# Patient Record
Sex: FEMALE | Race: WHITE | Employment: UNEMPLOYED | URBAN - METROPOLITAN AREA
[De-identification: names, ages, dates, MRNs, and addresses within clinical notes are randomized per-mention and may not be internally consistent; named-entity substitution may affect disease eponyms.]

---

## 2018-02-13 ENCOUNTER — HOSPITAL ENCOUNTER (OUTPATIENT)
Dept: OTHER | Age: 28
Discharge: HOME | End: 2018-02-28
Attending: OBSTETRICS & GYNECOLOGY | Admitting: OBSTETRICS & GYNECOLOGY

## 2018-03-01 ENCOUNTER — HOSPITAL ENCOUNTER (OUTPATIENT)
Dept: OTHER | Age: 28
Discharge: HOME | End: 2018-03-31
Attending: OBSTETRICS & GYNECOLOGY | Admitting: OBSTETRICS & GYNECOLOGY

## 2018-07-12 ENCOUNTER — HOSPITAL ENCOUNTER (OUTPATIENT)
Dept: OTHER | Age: 28
Discharge: HOME | End: 2018-07-13
Attending: OBSTETRICS & GYNECOLOGY | Admitting: OBSTETRICS & GYNECOLOGY

## 2019-05-01 ENCOUNTER — HOSPITAL ENCOUNTER (EMERGENCY)
Age: 29
Discharge: HOME | End: 2019-05-01
Attending: EMERGENCY MEDICINE
Payer: MEDICAID

## 2019-05-01 ENCOUNTER — APPOINTMENT (OUTPATIENT)
Dept: CT IMAGING | Age: 29
End: 2019-05-01
Payer: MEDICAID

## 2019-05-01 VITALS
WEIGHT: 150 LBS | OXYGEN SATURATION: 100 % | SYSTOLIC BLOOD PRESSURE: 119 MMHG | HEART RATE: 66 BPM | RESPIRATION RATE: 18 BRPM | TEMPERATURE: 98.7 F | DIASTOLIC BLOOD PRESSURE: 82 MMHG | BODY MASS INDEX: 23.54 KG/M2 | HEIGHT: 67 IN

## 2019-05-01 DIAGNOSIS — H54.7 DECREASED VISION: Primary | ICD-10-CM

## 2019-05-01 LAB
ANION GAP SERPL CALCULATED.3IONS-SCNC: 11 MMOL/L (ref 3–16)
BUN BLDV-MCNC: 14 MG/DL (ref 7–20)
CALCIUM SERPL-MCNC: 9.6 MG/DL (ref 8.3–10.6)
CHLORIDE BLD-SCNC: 103 MMOL/L (ref 99–110)
CO2: 26 MMOL/L (ref 21–32)
CREAT SERPL-MCNC: 0.6 MG/DL (ref 0.6–1.1)
GFR AFRICAN AMERICAN: >60
GFR NON-AFRICAN AMERICAN: >60
GLUCOSE BLD-MCNC: 102 MG/DL (ref 70–99)
POTASSIUM REFLEX MAGNESIUM: 3.8 MMOL/L (ref 3.5–5.1)
SODIUM BLD-SCNC: 140 MMOL/L (ref 136–145)

## 2019-05-01 PROCEDURE — 96374 THER/PROPH/DIAG INJ IV PUSH: CPT

## 2019-05-01 PROCEDURE — 80048 BASIC METABOLIC PNL TOTAL CA: CPT

## 2019-05-01 PROCEDURE — 70498 CT ANGIOGRAPHY NECK: CPT

## 2019-05-01 PROCEDURE — 6360000004 HC RX CONTRAST MEDICATION: Performed by: NURSE PRACTITIONER

## 2019-05-01 PROCEDURE — 6360000002 HC RX W HCPCS: Performed by: NURSE PRACTITIONER

## 2019-05-01 PROCEDURE — 70496 CT ANGIOGRAPHY HEAD: CPT

## 2019-05-01 PROCEDURE — 99284 EMERGENCY DEPT VISIT MOD MDM: CPT

## 2019-05-01 PROCEDURE — 96375 TX/PRO/DX INJ NEW DRUG ADDON: CPT

## 2019-05-01 RX ORDER — DIPHENHYDRAMINE HYDROCHLORIDE 50 MG/ML
25 INJECTION INTRAMUSCULAR; INTRAVENOUS ONCE
Status: COMPLETED | OUTPATIENT
Start: 2019-05-01 | End: 2019-05-01

## 2019-05-01 RX ORDER — METOCLOPRAMIDE HYDROCHLORIDE 5 MG/ML
10 INJECTION INTRAMUSCULAR; INTRAVENOUS ONCE
Status: COMPLETED | OUTPATIENT
Start: 2019-05-01 | End: 2019-05-01

## 2019-05-01 RX ORDER — VENLAFAXINE 75 MG/1
75 TABLET ORAL DAILY
COMMUNITY
End: 2020-12-22

## 2019-05-01 RX ADMIN — METOCLOPRAMIDE 10 MG: 5 INJECTION, SOLUTION INTRAMUSCULAR; INTRAVENOUS at 13:04

## 2019-05-01 RX ADMIN — DIPHENHYDRAMINE HYDROCHLORIDE 25 MG: 50 INJECTION, SOLUTION INTRAMUSCULAR; INTRAVENOUS at 13:04

## 2019-05-01 RX ADMIN — IOPAMIDOL 75 ML: 755 INJECTION, SOLUTION INTRAVENOUS at 15:08

## 2019-05-01 ASSESSMENT — PAIN DESCRIPTION - LOCATION: LOCATION: EYE

## 2019-05-01 ASSESSMENT — ENCOUNTER SYMPTOMS
EYE PAIN: 1
SORE THROAT: 0
EYE ITCHING: 0
PHOTOPHOBIA: 1
EYE DISCHARGE: 0
EYE REDNESS: 0

## 2019-05-01 ASSESSMENT — PAIN SCALES - GENERAL: PAINLEVEL_OUTOF10: 7

## 2019-05-01 ASSESSMENT — VISUAL ACUITY
OU: 20/20
OD: 20/20

## 2019-05-01 ASSESSMENT — PAIN DESCRIPTION - FREQUENCY: FREQUENCY: CONTINUOUS

## 2019-05-01 ASSESSMENT — PAIN DESCRIPTION - DESCRIPTORS: DESCRIPTORS: ACHING

## 2019-05-01 ASSESSMENT — PAIN DESCRIPTION - PAIN TYPE: TYPE: ACUTE PAIN

## 2019-05-01 ASSESSMENT — PAIN DESCRIPTION - ORIENTATION: ORIENTATION: LEFT

## 2019-05-01 NOTE — ED NOTES
Patient resting on stretcher, no needs at this time. Nad. VSS. Pt awaiting further dispo. Pt ambulated to bathroom with steady gait and no assist. Bed in low position, call bell in reach. police at bedside. Will continue to monitor.       Peterson Chirinos RN  05/01/19 3111

## 2019-05-01 NOTE — ED NOTES
IV attempt x 2 unsuccessful. Nivia Banuelos, EMERALD, to attempt.       Rosalind Cogan, RN  05/01/19 9112

## 2019-05-01 NOTE — ED PROVIDER NOTES
[address] ED  eMERGENCY dEPARTMENT eNCOUnter        Pt Name: Lorie De La Paz  MRN: [id number]  [place] 1990  Date of evaluation: 5/1/2019  Provider: ARIEL Singh CNP  PCP: Benson Watson DO    This patient was seen and evaluated by the attending physician Alize Lanier       Chief Complaint   Patient presents with    Eye Pain     pt states that she sat up in her bunk on sunday, felt a \"pop\" in her head and then started having L eye pain. pt states that her \"vision is black in my L eye since sunday. \"        HISTORY OF PRESENT ILLNESS   (Location/Symptom, Timing/Onset, Context/Setting, Quality, Duration, Modifying Factors, Severity)  Note limiting factors. Lorie De La Paz is a 33 y.o. female that presents to the ED today with complaints of left eye pain. 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 Patient states pain started on Sunday morning when she sat up in her bunk. She denies hitting her head. She has a headache that she describes as sharp and stabbing in the front of her head. She states she had immediate vision loss in her left eye. She states she saw the nurse at the care home at the time of the incident. She states everything is black and she is not able to see anything in her left eye. She complains of no recent history of head trauma. She complains of photophobia she denies fever and chills. Denies recent history of cough congestion runny nose or sinus infection. Denies redness or draining from her eyes. Nursing Notes were all reviewed and agreed with or any disagreements were addressed  in the HPI. REVIEW OF SYSTEMS    (2-9 systems for level 4, 10 or more for level 5)     Review of Systems   Constitutional: Negative for chills and fever. HENT: Positive for ear pain. Negative for congestion and sore throat. Eyes: Positive for photophobia, pain and visual disturbance. Negative for discharge, redness and itching. Skin: Negative for rash. Neurological: Positive for headaches. Negative for dizziness, seizures, syncope, speech difficulty and numbness. Feel like going to pass out due to pain. States vomiting 10 + times secondary to the pain. Positives and Pertinent negatives as per HPI. Except as noted abovein the ROS, all other systems were reviewed and negative. PAST MEDICAL HISTORY     Past Medical History:   Diagnosis Date    Anemia     Anxiety     Bipolar disorder ([address]     Declines CF and Quad screen 12/19/2011    Drug use 12/19/2011    Hx of Marijuana use Positive amphetamine drug screen-08/03/2012     Hepatitis C     Hx of amniocentesis July, 2012    Kidney stone     Migraines     worsened with pregnancy, takes Reglan for    [facility] (premature atrial contraction)     baby has PAC's, will need EKG after delivery    Smokes 1-2 cigs/day 12/19/2011    Pt quit smoking, blanket was given. SURGICAL HISTORY     Past Surgical History:   Procedure Laterality Date    CHOLECYSTECTOMY  9/25/2014    laparoscopic    KNEE ARTHROSCOPY  2008, 2009    2  rt. knee surg, torn meniscus    KNEE ARTHROSCOPY Right 1/2/13    Video arthroscopy with partial medial and lateral meniscectomy, chondroplasty, removal of loose bodies and medial plica excision    WISDOM TOOTH EXTRACTION           CURRENTMEDICATIONS       Discharge Medication List as of 5/1/2019  4:46 PM      CONTINUE these medications which have NOT CHANGED    Details   venlafaxine (EFFEXOR) 75 MG tablet Take 75 mg by mouth dailyHistorical Med      Buprenorphine HCl-Naloxone HCl (SUBOXONE SL) Place 16 mg under the tongue dailyHistorical Med               ALLERGIES     Mobic [meloxicam];  Ondansetron; and Voltaren [diclofenac sodium]    FAMILYHISTORY       Family History   Problem Relation Age of Onset    Depression Mother     Thyroid Disease Maternal Grandmother     Arthritis Maternal Grandmother     Hypertension Maternal Grandfather SOCIAL HISTORY       Social History     Socioeconomic History    Marital status: Single     Spouse name: None    Number of children: 1    Years of education: None    Highest education level: None   Occupational History     Comment: [employer]   Social Needs    Financial resource strain: None    Food insecurity:     Worry: None     Inability: None    Transportation needs:     Medical: None     Non-medical: None   Tobacco Use    Smoking status: Former Smoker     Packs/day: 0.50     Years: 5.00     Pack years: 2.50     Types: Cigarettes     Last attempt to quit: 2018     Years since quittin.4    Smokeless tobacco: Former User   Substance and Sexual Activity    Alcohol use: No    Drug use: No     Comment: before pregnancy and 'a little in the beginning'    Sexual activity: Yes     Partners: Male   Lifestyle    Physical activity:     Days per week: None     Minutes per session: None    Stress: None   Relationships    Social connections:     Talks on phone: None     Gets together: None     Attends Caodaism service: None     Active member of club or organization: None     Attends meetings of clubs or organizations: None     Relationship status: None    Intimate partner violence:     Fear of current or ex partner: None     Emotionally abused: None     Physically abused: None     Forced sexual activity: None   Other Topics Concern    None   Social History Narrative    None       SCREENINGS    [place] Coma Scale  Eye Opening: Spontaneous  Best Verbal Response: Oriented  Best Motor Response: Obeys commands  [place] Coma Scale Score: 15        PHYSICAL EXAM    (up to 7 for level 4, 8 or more for level 5)     ED Triage Vitals [19 1058]   BP Temp Temp Source Pulse Resp SpO2 Height Weight   (!) 117/92 98.7 °F (37.1 °C) Oral 76 18 100 % 5' 7\" (1.702 m) 150 lb (68 kg)       Physical Exam   Constitutional: She is oriented to person, place, and time.  She appears well-developed and well-nourished. No distress. HENT:   Head: Normocephalic and atraumatic. Right Ear: External ear normal.   Left Ear: External ear normal.   Eyes: Pupils are equal, round, and reactive to light. Conjunctivae and EOM are normal.   EM and intact patient does complain of slight pain with movement there is no redness or drainage. Pupils are equal round and reactive to light. She does complain of photophobia. Pupils 4/3 reactive bilaterally. No cloudiness present. Neck: Normal range of motion. No JVD present. No tracheal deviation present. Cardiovascular: Normal rate, regular rhythm, normal heart sounds and intact distal pulses. Exam reveals no gallop and no friction rub. No murmur heard. Pulmonary/Chest: Effort normal and breath sounds normal. No stridor. No respiratory distress. She has no wheezes. She has no rales. She exhibits no tenderness. Abdominal:   Does not complain of abdominal pain or tenderness   Musculoskeletal: Normal range of motion. Lymphadenopathy:     She has no cervical adenopathy. Neurological: She is alert and oriented to person, place, and time. No cranial nerve deficit or sensory deficit. Coordination normal.   Skin: Skin is warm and dry. Capillary refill takes less than 2 seconds. No rash noted. She is not diaphoretic. No erythema. No pallor. Psychiatric: She has a normal mood and affect. Her behavior is normal. Judgment normal.       DIAGNOSTIC RESULTS   LABS:    Labs Reviewed   BASIC METABOLIC PANEL W/ REFLEX TO MG FOR LOW K - Abnormal; Notable for the following components:       Result Value    Glucose 102 (*)     All other components within normal limits    Narrative:     Performed at:  [facility]) - [address]   Phone [phone]       All other labs were within normal range or not returned as of this dictation. EKG:  All EKG's are interpreted by the Emergency Department Physician who either signs orCo-signs this chart in the absence of a cardiologist.  Please see their note for interpretation of EKG. RADIOLOGY:   Non-plain film images such as CT, Ultrasound and MRI are read by the radiologist. Plain radiographic images are visualized andpreliminarily interpreted by the  ED Provider with the below findings:        Interpretation perthe Radiologist below, if available at the time of this note:    CTA HEAD W CONTRAST   Final Result   Unremarkable CTA of the head and neck. No evidence of intracranial aneurysm. A noncontrast examination of the head was not performed, limiting evaluation   for intracranial hemorrhage. CTA NECK W CONTRAST   Final Result   Unremarkable CTA of the head and neck. No evidence of intracranial aneurysm. A noncontrast examination of the head was not performed, limiting evaluation   for intracranial hemorrhage. No results found. PROCEDURES   Unless otherwise noted below, none     Procedures    CRITICAL CARE TIME   N/A    CONSULTS:  IP CONSULT TO OPHTHALMOLOGY      EMERGENCY DEPARTMENT COURSE and DIFFERENTIALDIAGNOSIS/MDM:   Vitals:    Vitals:    05/01/19 1058 05/01/19 1417 05/01/19 1657   BP: (!) 117/92 134/83 119/82   Pulse: 76 74 66   Resp: 18 16 18   Temp: 98.7 °F (37.1 °C)     TempSrc: Oral     SpO2: 100% 100% 100%   Weight: 150 lb (68 kg)     Height: 5' 7\" (1.702 m)         Patient was given thefollowing medications:  Medications   diphenhydrAMINE (BENADRYL) injection 25 mg (25 mg Intravenous Given 5/1/19 1304)   metoclopramide (REGLAN) injection 10 mg (10 mg Intravenous Given 5/1/19 1304)   iopamidol (ISOVUE-370) 76 % injection 75 mL (75 mLs Intravenous Given 5/1/19 1508)       MDM  Tang Orozco is a 60-year-old female patient presents to the ED today with complaints of headache and decreased patient in her left eye. The patient was seen by myself and Dr. Hammad Sorensen.  On presentation patient's awake alert and oriented she is calm and cooperative she is complaining of a frontal headache that she describes as sharp and stabbing and complaining of decreased vision in her left eye she states that she cannot see anything out of her left eye. My workup in the ED included visual acutity exam, BMP and CTA head and neck. On PE there is no outward signs of trauma and the patient does not complain of any head injury, scalp was intact as no rashes lesions swelling or bruising. Pupils are equal round and reactive to light, EOMs intact however she does complain of slight pain with movement of her left eye. There is no drainage or redness to the conjunctival. No cloudiness. Funduscopic exam without abnormality. + Photophobia. I was not able to obtain occular pressures due to the tonopen not functioning properly. Patient was treated with reglan and benadryl for atypical headache which she stated did not give her much relief. Sounds the CT head and neck were unremarkable and showed no evidence of intracranial aneurysm or intracranial hemorrhage. I discussed the results of the CT scan with the patient. I discussed the patient's condition with ophthalmology Dr. Debbie Tavarez who recommends follow-up on Thursday or Friday in their office. This time I feel the patient safe for discharge home. Discharge instructions given to the patient with instructions to follow up with ophthalmology on Thursday or Friday. Instructions were given for symptoms that would warrant return back to the ED. FINAL IMPRESSION      1.  Decreased vision          DISPOSITION/PLAN   DISPOSITION Decision To Discharge 05/01/2019 04:41:02 PM      PATIENT REFERREDTO:  Supriya Webb[address] [facility]. [id number] [phone]    Call in 1 day  Will need to call and schedule an appointment on Thursday or Friday    Michela Tavarez, [address] [facility]            DISCHARGE MEDICATIONS:  Discharge Medication List as of 5/1/2019  4:46 PM DISCONTINUED MEDICATIONS:  Discharge Medication List as of 5/1/2019  4:46 PM                 (Please note that portions ofthis note were completed with a voice recognition program.  Efforts were made to edit the dictations but occasionally words are mis-transcribed.)    ARIEL Singh CNP (electronically signed)          ARIEL Singh CNP  05/01/19 180

## 2019-05-01 NOTE — ED NOTES
Pt to CT at this time. Pt stable upon transport. Will continue to monitor.       Jesse Samson RN  05/01/19 5435

## 2019-05-02 ENCOUNTER — HOSPITAL ENCOUNTER (EMERGENCY)
Age: 29
Discharge: HOME | End: 2019-05-02
Attending: EMERGENCY MEDICINE
Payer: MEDICAID

## 2019-05-02 VITALS
HEART RATE: 78 BPM | HEIGHT: 67 IN | WEIGHT: 150 LBS | SYSTOLIC BLOOD PRESSURE: 98 MMHG | BODY MASS INDEX: 23.54 KG/M2 | OXYGEN SATURATION: 100 % | DIASTOLIC BLOOD PRESSURE: 61 MMHG | TEMPERATURE: 98.3 F

## 2019-05-02 DIAGNOSIS — F44.9 CONVERSION DISORDER: ICD-10-CM

## 2019-05-02 DIAGNOSIS — H54.7 VISION LOSS: ICD-10-CM

## 2019-05-02 DIAGNOSIS — R51.9 ACUTE NONINTRACTABLE HEADACHE, UNSPECIFIED HEADACHE TYPE: Primary | ICD-10-CM

## 2019-05-02 DIAGNOSIS — R11.0 NAUSEA: ICD-10-CM

## 2019-05-02 PROCEDURE — 6370000000 HC RX 637 (ALT 250 FOR IP): Performed by: NURSE PRACTITIONER

## 2019-05-02 PROCEDURE — 99284 EMERGENCY DEPT VISIT MOD MDM: CPT

## 2019-05-02 RX ORDER — METOCLOPRAMIDE 10 MG/1
10 TABLET ORAL ONCE
Status: COMPLETED | OUTPATIENT
Start: 2019-05-02 | End: 2019-05-02

## 2019-05-02 RX ORDER — METOCLOPRAMIDE 10 MG/1
10 TABLET ORAL 4 TIMES DAILY PRN
Qty: 10 TABLET | Refills: 0 | Status: SHIPPED | OUTPATIENT
Start: 2019-05-02 | End: 2020-12-22

## 2019-05-02 RX ORDER — DIPHENHYDRAMINE HCL 25 MG
25 TABLET ORAL ONCE
Status: COMPLETED | OUTPATIENT
Start: 2019-05-02 | End: 2019-05-02

## 2019-05-02 RX ORDER — ACETAMINOPHEN 325 MG/1
650 TABLET ORAL EVERY 6 HOURS PRN
Qty: 40 TABLET | Refills: 0 | Status: SHIPPED | OUTPATIENT
Start: 2019-05-02 | End: 2020-12-22

## 2019-05-02 RX ADMIN — DIPHENHYDRAMINE HCL 25 MG: 25 TABLET ORAL at 15:51

## 2019-05-02 RX ADMIN — METOCLOPRAMIDE 10 MG: 10 TABLET ORAL at 15:51

## 2019-05-02 ASSESSMENT — VISUAL ACUITY
OD: 20/30
OU: 20/30

## 2019-05-02 ASSESSMENT — PAIN DESCRIPTION - PAIN TYPE: TYPE: ACUTE PAIN

## 2019-05-02 ASSESSMENT — PAIN DESCRIPTION - LOCATION: LOCATION: HEAD

## 2019-05-02 NOTE — ED PROVIDER NOTES
ED Course as of May 02 1211   Wed May 01, 2019   1154 Point of care limited Ocular exam  Procedure note:  Indication: eye pain, eye/orbital trauma, vision change, visual loss, head injury, suspected foreign body, headache, educational, credentialing    Billable study: yes    Views:  Left eye retinal Contour: normal  Left eye vitreous body: anechoic yes, hyperechoic density no    Impression:   Sonographic evidence of retinal detachment: Absent   Vitreous hemorrhage: Absent      Images obtained by estela, interpreted by estela. Images were saved to ultrasound machine.         [WL]   1227 I have personally performed and/or participated in the history, exam and medical decision making and agree with all pertinent clinical information. I have also reviewed and agree with the past medical, family and social history unless otherwise noted. Patient presents for evaluation of left eye complete vision loss since Sunday onset when she had left side headache that has been persistent. She denies eye pain. There is no redness. She did not have any trauma. She has worse headache with extraocular motion. Patient nontoxic in no acute distress. Extraocular motion is full and intact with no periorbital erythema or significant edema. Pupils are equal round and reactive to light bilaterally with no increasing pain. She has no tenderness over the temporal area    Patient with painless vision loss that she does have headache, and migraine is considered in the differential.  Will discuss case with ophthalmology especially given access to care issues with being imprisoned. Ultrasound negative for acute lens/vitreous/retinal apparent issues. Unable to obtain good view of optic nerve for measurement.   Obtaining vascular studies    [WL]      ED Course User Index  [WL] DO Ale Best DO  05/02/19 1212

## 2019-05-02 NOTE — ED NOTES
Presenting Problem: Pt can not see out of her left eye and see black spots out of her right eye. Appearance/Hygiene:  well-appearing, good grooming and good hygiene   Motor Behavior: WNL   Attitude: cooperative  Affect: normal affect   Speech: normal pitch and normal volume  Mood: anxious and within normal limits   Thought Processes: [place]  Perceptions: Absent   Thought content: Clear  Suicidal ideation:  None  Homicidal ideation:  none  Orientation: A&Ox4   Memory: intact  Concentration: Good    Insight/ judgement: normal insight and judgment      Psychosocial and contextual factors: Pt claims she can not see out of her left eye and sees black spots out of her right eye. C-SSRS Summary (including current and past suicidal ideation, plan, intent, and attempts) : None    Psychiatric History: None    Patient reported diagnosis: anxiety, PTSD, depression.      Outpatient services/ Provider: None    Previous Inpatient Admissions( including location and dates if known): None    Self-injurious/ Self-harm behavior: None    History of violence: None    Current Substance use: None    Trauma identified: None    Access to Firearms: None    ASSESSMENT FOR IMMINENT FUTURE DANGER:      RISK FACTORS:    [x]  Age <25 or >49   []  Male gender   []  Depressed mood   []  Active suicidal ideation   []  Suicide plan   []  Suicide attempt   []  Access to lethal means   []  Prior suicide attempt   []  Active substance abuse   []  Highly impulsive behaviors   []  Not attending to self-care/ADLs    []  Recent significant loss   []  Chronic pain or medical illness   []  Social isolation   []  History of violence   []  Active psychosis   []  Cognitive impairment    [x]  No outpatient services in place   []  Medication noncompliance   []  No collateral information to support safety      PROTECTIVE FACTORS:  [] Age >25 and <55  [x] Female gender   [] Denies depression  [] Denies suicidal ideation  [] Does not have lethal plan   [] Does not have access to guns or weapons  [] Patient is verbally mary for safety  [x] No prior suicide attempts  [x] No active substance abuse  [x] Patient has social or family support  [x] No active psychosis or cognitive dysfunction  [x] Physically healthy  [] Has outpatient services in place  [x] Compliant with recommended medications  [] Patient is future oriented with plans to get out of prison. Clinical Summary:    Patient presents to the ED voluntarily after losing her vision in her left eye. Patient was clinically sober at the time of the evaluation. Patient was evaluated and offered supportive counseling. Pt states Sunday she started seeing black spot in her left eye, then completely lost vision in the left eye. Pt was brought to the hospital and checked out yesterday. No problems were noted. Today the pt states she continues to not have vision to her left eye and now she sees black spots through her right eye. Pt also states the left side of her body is numb and she can not feel anything. Pt states she takes effexor 75 mg daily and has for over a year.   Pt states she is not suicidal or homicidal.       Kassie Abrams RN  05/02/19 7574

## 2019-05-02 NOTE — ED NOTES
Level of Care Disposition:  Discharge    Patient was seen by ED provider and [facility] AN AFFILIATE [facility] staff. The case was presented to psychiatric provider on-call Dr. Merly José. Based on the ED evaluation and information presented to the provider by Mona Rosario, the decision was made to discharge patient with the following referrals: outpatient treatment      RATIONALE FOR NON-ADMISSION:  The patient does not meet criteria for an involuntary psychiatric admission because pt does not pose an imminent threat to herself or anyone else.     Insurance Pre certification Authorization: JENNY Vasques RN  05/02/19 7739

## 2019-05-02 NOTE — ED PROVIDER NOTES
[address] ED  eMERGENCY dEPARTMENT eNCOUnter        Pt Name: John Andino  MRN: [id number]  [place] 1990  Date of evaluation: 5/2/2019  Provider: ARIEL Bradford CNP  PCP: Anthony Martin DO  ED Attending: No att. providers found    [address] [place] [address]       Chief Complaint   Patient presents with    Headache     headache started sunday    Eye Problem     was here yesterday got left eye checked out, lost vision to left eye on sunday and right eye today seeing black spots. HISTORY OF PRESENT ILLNESS   (Location/Symptom, Timing/Onset, Context/Setting, Quality, Duration, Modifying Factors, Severity)  Note limiting factors. John Andino is a 33 y.o. female  who presents to the emergency department with complaints of headache and vision loss. Patient was seen and fully evaluated in this emergency department yesterday for left vision loss and headache. She reports that her symptoms started on Sunday after sitting up in bed and feeling a pop in her head. Since that time she has had no vision in her left eye. She reports that since yesterday her headache has worsened and she is now seeing black spots in her right eye. Headache is about a 7 out of 10. She is currently in the [facility] and is accompanied by a . She was supposed to follow up with ophthalmology today or tomorrow however it is unknown if she has an appointment. Nursing Notes were all reviewed and agreed with or any disagreements were addressed  in the HPI. REVIEW OF SYSTEMS    (2-9 systems for level 4, 10 or more for level 5)     Review of Systems   Constitutional: Negative for chills and fever. HENT: Negative. Eyes: Positive for pain and visual disturbance. Negative for photophobia, discharge, redness and itching. Respiratory: Negative for cough and shortness of breath. Gastrointestinal: Positive for nausea and vomiting. Negative for abdominal pain.    Genitourinary: Negative for dysuria. Musculoskeletal: Negative. Skin: Negative. Neurological: Positive for headaches. Negative for dizziness, weakness, light-headedness and numbness. Psychiatric/Behavioral: Negative. Positives and Pertinent negatives as per HPI. Except as noted abovein the ROS, all other systems were reviewed and negative. PAST MEDICAL HISTORY     Past Medical History:   Diagnosis Date    Anemia     Anxiety     Bipolar disorder ([address]     Declines CF and Quad screen 12/19/2011    Drug use 12/19/2011    Hx of Marijuana use Positive amphetamine drug screen-08/03/2012     Hepatitis C     Hx of amniocentesis July, 2012    Kidney stone     Migraines     worsened with pregnancy, takes Reglan for    [facility] (premature atrial contraction)     baby has PAC's, will need EKG after delivery    Smokes 1-2 cigs/day 12/19/2011    Pt quit smoking, blanket was given. SURGICAL HISTORY     Past Surgical History:   Procedure Laterality Date    CHOLECYSTECTOMY  9/25/2014    laparoscopic    KNEE ARTHROSCOPY  2008, 2009    2  rt. knee surg, torn meniscus    KNEE ARTHROSCOPY Right 1/2/13    Video arthroscopy with partial medial and lateral meniscectomy, chondroplasty, removal of loose bodies and medial plica excision    WISDOM TOOTH EXTRACTION           CURRENTMEDICATIONS       Discharge Medication List as of 5/2/2019  5:14 PM      CONTINUE these medications which have NOT CHANGED    Details   venlafaxine (EFFEXOR) 75 MG tablet Take 75 mg by mouth dailyHistorical Med      Buprenorphine HCl-Naloxone HCl (SUBOXONE SL) Place 16 mg under the tongue dailyHistorical Med               ALLERGIES     Mobic [meloxicam];  Ondansetron; and Voltaren [diclofenac sodium]    FAMILYHISTORY       Family History   Problem Relation Age of Onset    Depression Mother     Thyroid Disease Maternal Grandmother     Arthritis Maternal Grandmother     Hypertension Maternal Grandfather           SOCIAL HISTORY Social History     Socioeconomic History    Marital status: Single     Spouse name: None    Number of children: 1    Years of education: None    Highest education level: None   Occupational History     Comment: [employer]   Social Needs    Financial resource strain: None    Food insecurity:     Worry: None     Inability: None    Transportation needs:     Medical: None     Non-medical: None   Tobacco Use    Smoking status: Former Smoker     Packs/day: 0.50     Years: 5.00     Pack years: 2.50     Types: Cigarettes     Last attempt to quit: 2018     Years since quittin.4    Smokeless tobacco: Former User   Substance and Sexual Activity    Alcohol use: No    Drug use: No     Comment: before pregnancy and 'a little in the beginning'    Sexual activity: Yes     Partners: Male   Lifestyle    Physical activity:     Days per week: None     Minutes per session: None    Stress: None   Relationships    Social connections:     Talks on phone: None     Gets together: None     Attends Temple service: None     Active member of club or organization: None     Attends meetings of clubs or organizations: None     Relationship status: None    Intimate partner violence:     Fear of current or ex partner: None     Emotionally abused: None     Physically abused: None     Forced sexual activity: None   Other Topics Concern    None   Social History Narrative    None       SCREENINGS             PHYSICAL EXAM    (up to 7 for level 4, 8 or more for level 5)     ED Triage Vitals [19 1142]   BP Temp Temp Source Pulse Resp SpO2 Height Weight   111/81 98.3 °F (36.8 °C) Oral 78 -- 100 % 5' 7\" (1.702 m) 150 lb (68 kg)       Physical Exam   Constitutional: She appears well-developed and well-nourished. HENT:   Head: Normocephalic and atraumatic. Right Ear: External ear normal.   Left Ear: External ear normal.   Nose: Nose normal.   Eyes: Pupils are equal, round, and reactive to light.  Conjunctivae, EOM and lids are normal. Right eye exhibits no chemosis, no discharge, no exudate and no hordeolum. No foreign body present in the right eye. Left eye exhibits no chemosis, no discharge, no exudate and no hordeolum. No foreign body present in the left eye. Right conjunctiva is not injected. Right conjunctiva has no hemorrhage. Left conjunctiva is not injected. Left conjunctiva has no hemorrhage. Right eye exhibits normal extraocular motion and no nystagmus. Left eye exhibits normal extraocular motion and no nystagmus. Fundoscopic exam:       The right eye shows no exudate, no hemorrhage and no papilledema. The left eye shows no exudate, no hemorrhage and no papilledema. Pupils 5mm bilaterally. Left visual field deficit. Neck: Normal range of motion. Cardiovascular: Normal rate and regular rhythm. Pulmonary/Chest: Effort normal and breath sounds normal.   Abdominal: Soft. Bowel sounds are normal. She exhibits no distension. There is no tenderness. Musculoskeletal: Normal range of motion. Neurological: She is alert. No sensory deficit. She exhibits normal muscle tone. Skin: Skin is warm and dry. Psychiatric: She has a normal mood and affect. Her behavior is normal.   Nursing note and vitals reviewed. DIAGNOSTIC RESULTS   LABS:    Labs Reviewed - No data to display    All other labs were within normal range or not returned as of this dictation. EKG: All EKG's are interpreted by the Emergency Department Physician who either signs orCo-signs this chart in the absence of a cardiologist.  Please see their note for interpretation of EKG. RADIOLOGY:   Non-plain film images such as CT, Ultrasound and MRI are read by the radiologist. Plain radiographic images are visualized andpreliminarily interpreted by the  ED Provider with the below findings:    Interpretation per theRadiologist below, if available at the time of this note:    No orders to display     No results found.       PROCEDURES denies questions at time. FINAL IMPRESSION      1. Acute nonintractable headache, unspecified headache type    2. Vision loss    3. Nausea    4. Conversion disorder          DISPOSITION/PLAN   DISPOSITION Decision To Discharge 05/02/2019 12:40:50 PM      PATIENT REFERRED TO:  [address] [facility]  [phone]    Schedule an appointment as soon as possible for a visit in 1 day  For follow up care    [facility]  [address] [address]  [phone]  Go to   As needed, If symptoms worsen      DISCHARGE MEDICATIONS:  Discharge Medication List as of 5/2/2019  5:14 PM      START taking these medications    Details   metoclopramide (REGLAN) 10 MG tablet Take 1 tablet by mouth 4 times daily as needed (Nausea) WARNING:  May cause drowsiness. May impair ability to operate vehicles or machinery.   Do not use in combination with alcohol., Disp-10 tablet, R-0Print      acetaminophen (AMINOFEN) 325 MG tablet Take 2 tablets by mouth every 6 hours as needed for Pain, Disp-40 tablet, R-0Print             DISCONTINUED MEDICATIONS:  Discharge Medication List as of 5/2/2019  5:14 PM                 (Please note that portions ofthis note were completed with a voice recognition program.  Efforts were made to edit the dictations but occasionally words are mis-transcribed.)    ARIEL Angelo CNP (electronically signed)         ARIEL Angelo CNP  05/02/19 1100 [facility], APRN - CNP  05/03/19 7401 [facility], APRN - CNP  05/03/19 8767

## 2019-05-02 NOTE — ED PROVIDER NOTES
I independently examined and evaluated Xiomara Galindo. In brief, 27-year-old female with headache and change in vision    Focused exam revealed patient is awake, alert, oriented ×3 in no acute distress  Eyes are EOMI, PERRLA. Conjunctiva is clear  . Cardiac is regular rate and rhythm. Lungs are clear to auscultation  ED course: We did speak to [facility]. Patient did have a thorough workup for this yesterday. She is to schedule a follow-up appointment in their office. All diagnostic, treatment, and disposition decisions were made by myself in conjunction with the advanced practice provider. For all further details of the patient's emergency department visit, please see the advanced practice provider's documentation. .  Impression change in vision    Comment: Please note this report has been produced using speech recognition software and may contain errors related to that system including errors in grammar, punctuation, and spelling, as well as words and phrases that may be inappropriate. If there are any questions or concerns please feel free to contact the dictating provider for clarification. Magaly Schwartz MD  05/02/19 7218

## 2019-05-02 NOTE — ED PROVIDER NOTES
Care of patient is handed off to me by Dr. Anamika Arroyo pending behavioral team evaluation and final disposition. Behavioral team evaluated the patient and deemed patient safe for discharge to long-term at this time. Patient was given outpatient resources. Patient will follow-up as discussed. Patient discharged in stable condition.          Millie Monson MD  05/02/19 5101

## 2019-05-03 ASSESSMENT — ENCOUNTER SYMPTOMS
VOMITING: 1
PHOTOPHOBIA: 0
ABDOMINAL PAIN: 0
COUGH: 0
NAUSEA: 1
EYE ITCHING: 0
EYE PAIN: 1
EYE DISCHARGE: 0
EYE REDNESS: 0
SHORTNESS OF BREATH: 0

## 2020-12-22 ENCOUNTER — APPOINTMENT (OUTPATIENT)
Dept: GENERAL RADIOLOGY | Age: 30
End: 2020-12-22
Payer: COMMERCIAL

## 2020-12-22 ENCOUNTER — HOSPITAL ENCOUNTER (EMERGENCY)
Age: 30
Discharge: HOME | End: 2020-12-23
Payer: COMMERCIAL

## 2020-12-22 VITALS
BODY MASS INDEX: 26.63 KG/M2 | OXYGEN SATURATION: 100 % | WEIGHT: 170 LBS | HEART RATE: 68 BPM | TEMPERATURE: 97.6 F | RESPIRATION RATE: 16 BRPM | SYSTOLIC BLOOD PRESSURE: 104 MMHG | DIASTOLIC BLOOD PRESSURE: 67 MMHG

## 2020-12-22 PROCEDURE — 96366 THER/PROPH/DIAG IV INF ADDON: CPT

## 2020-12-22 PROCEDURE — 96365 THER/PROPH/DIAG IV INF INIT: CPT

## 2020-12-22 PROCEDURE — 73090 X-RAY EXAM OF FOREARM: CPT

## 2020-12-22 PROCEDURE — 96368 THER/DIAG CONCURRENT INF: CPT

## 2020-12-22 PROCEDURE — 96372 THER/PROPH/DIAG INJ SC/IM: CPT

## 2020-12-22 PROCEDURE — 99283 EMERGENCY DEPT VISIT LOW MDM: CPT

## 2020-12-22 RX ORDER — SERTRALINE HYDROCHLORIDE 100 MG/1
100 TABLET, FILM COATED ORAL DAILY
COMMUNITY
End: 2021-03-19 | Stop reason: ALTCHOICE

## 2020-12-22 RX ORDER — BUPRENORPHINE HYDROCHLORIDE 8 MG/1
20 TABLET SUBLINGUAL DAILY
COMMUNITY
End: 2021-04-16

## 2020-12-22 ASSESSMENT — ENCOUNTER SYMPTOMS
RHINORRHEA: 0
ABDOMINAL PAIN: 0
SHORTNESS OF BREATH: 0
SORE THROAT: 0
COLOR CHANGE: 0

## 2020-12-22 ASSESSMENT — PAIN SCALES - GENERAL: PAINLEVEL_OUTOF10: 7

## 2020-12-23 LAB
A/G RATIO: 0.9 (ref 1.1–2.2)
ALBUMIN SERPL-MCNC: 4 G/DL (ref 3.4–5)
ALP BLD-CCNC: 87 U/L (ref 40–129)
ALT SERPL-CCNC: 16 U/L (ref 10–40)
ANION GAP SERPL CALCULATED.3IONS-SCNC: 9 MMOL/L (ref 3–16)
AST SERPL-CCNC: 24 U/L (ref 15–37)
BASOPHILS ABSOLUTE: 0 K/UL (ref 0–0.2)
BASOPHILS RELATIVE PERCENT: 0.4 %
BILIRUB SERPL-MCNC: 0.5 MG/DL (ref 0–1)
BUN BLDV-MCNC: 9 MG/DL (ref 7–20)
CALCIUM SERPL-MCNC: 9.5 MG/DL (ref 8.3–10.6)
CHLORIDE BLD-SCNC: 97 MMOL/L (ref 99–110)
CO2: 29 MMOL/L (ref 21–32)
CREAT SERPL-MCNC: 0.8 MG/DL (ref 0.6–1.1)
EOSINOPHILS ABSOLUTE: 0 K/UL (ref 0–0.6)
EOSINOPHILS RELATIVE PERCENT: 0.6 %
GFR AFRICAN AMERICAN: >60
GFR NON-AFRICAN AMERICAN: >60
GLOBULIN: 4.5 G/DL
GLUCOSE BLD-MCNC: 105 MG/DL (ref 70–99)
HCG QUALITATIVE: NEGATIVE
HCT VFR BLD CALC: 38 % (ref 36–48)
HEMOGLOBIN: 12.5 G/DL (ref 12–16)
LACTIC ACID, SEPSIS: 1 MMOL/L (ref 0.4–1.9)
LYMPHOCYTES ABSOLUTE: 1.9 K/UL (ref 1–5.1)
LYMPHOCYTES RELATIVE PERCENT: 23.7 %
MCH RBC QN AUTO: 25.1 PG (ref 26–34)
MCHC RBC AUTO-ENTMCNC: 32.8 G/DL (ref 31–36)
MCV RBC AUTO: 76.5 FL (ref 80–100)
MONOCYTES ABSOLUTE: 0.6 K/UL (ref 0–1.3)
MONOCYTES RELATIVE PERCENT: 7.7 %
NEUTROPHILS ABSOLUTE: 5.5 K/UL (ref 1.7–7.7)
NEUTROPHILS RELATIVE PERCENT: 67.6 %
PDW BLD-RTO: 14.8 % (ref 12.4–15.4)
PLATELET # BLD: 264 K/UL (ref 135–450)
PMV BLD AUTO: 7.5 FL (ref 5–10.5)
POTASSIUM REFLEX MAGNESIUM: 3.7 MMOL/L (ref 3.5–5.1)
PROCALCITONIN: 0.03 NG/ML (ref 0–0.15)
RBC # BLD: 4.96 M/UL (ref 4–5.2)
SEDIMENTATION RATE, ERYTHROCYTE: 28 MM/HR (ref 0–20)
SODIUM BLD-SCNC: 135 MMOL/L (ref 136–145)
TOTAL PROTEIN: 8.5 G/DL (ref 6.4–8.2)
WBC # BLD: 8.1 K/UL (ref 4–11)

## 2020-12-23 PROCEDURE — 96366 THER/PROPH/DIAG IV INF ADDON: CPT

## 2020-12-23 PROCEDURE — 85652 RBC SED RATE AUTOMATED: CPT

## 2020-12-23 PROCEDURE — 87040 BLOOD CULTURE FOR BACTERIA: CPT

## 2020-12-23 PROCEDURE — 90471 IMMUNIZATION ADMIN: CPT | Performed by: NURSE PRACTITIONER

## 2020-12-23 PROCEDURE — 2580000003 HC RX 258: Performed by: NURSE PRACTITIONER

## 2020-12-23 PROCEDURE — 90715 TDAP VACCINE 7 YRS/> IM: CPT | Performed by: NURSE PRACTITIONER

## 2020-12-23 PROCEDURE — 85025 COMPLETE CBC W/AUTO DIFF WBC: CPT

## 2020-12-23 PROCEDURE — 80053 COMPREHEN METABOLIC PANEL: CPT

## 2020-12-23 PROCEDURE — 84703 CHORIONIC GONADOTROPIN ASSAY: CPT

## 2020-12-23 PROCEDURE — 96372 THER/PROPH/DIAG INJ SC/IM: CPT

## 2020-12-23 PROCEDURE — 84145 PROCALCITONIN (PCT): CPT

## 2020-12-23 PROCEDURE — 83605 ASSAY OF LACTIC ACID: CPT

## 2020-12-23 PROCEDURE — 6360000002 HC RX W HCPCS: Performed by: NURSE PRACTITIONER

## 2020-12-23 PROCEDURE — 96368 THER/DIAG CONCURRENT INF: CPT

## 2020-12-23 PROCEDURE — 96365 THER/PROPH/DIAG IV INF INIT: CPT

## 2020-12-23 RX ORDER — SULFAMETHOXAZOLE AND TRIMETHOPRIM 800; 160 MG/1; MG/1
1 TABLET ORAL 2 TIMES DAILY
Qty: 20 TABLET | Refills: 0 | Status: SHIPPED | OUTPATIENT
Start: 2020-12-23 | End: 2021-01-02

## 2020-12-23 RX ORDER — CEPHALEXIN 500 MG/1
500 CAPSULE ORAL 4 TIMES DAILY
Qty: 40 CAPSULE | Refills: 0 | Status: SHIPPED | OUTPATIENT
Start: 2020-12-23 | End: 2021-01-02

## 2020-12-23 RX ADMIN — VANCOMYCIN HYDROCHLORIDE 1.5 G: 10 INJECTION, POWDER, LYOPHILIZED, FOR SOLUTION INTRAVENOUS at 00:04

## 2020-12-23 RX ADMIN — TETANUS TOXOID, REDUCED DIPHTHERIA TOXOID AND ACELLULAR PERTUSSIS VACCINE, ADSORBED 0.5 ML: 5; 2.5; 8; 8; 2.5 SUSPENSION INTRAMUSCULAR at 00:03

## 2020-12-23 RX ADMIN — CEFEPIME HYDROCHLORIDE 2 G: 2 INJECTION, POWDER, FOR SOLUTION INTRAVENOUS at 00:04

## 2020-12-23 NOTE — ED PROVIDER NOTES
Evaluated by [id number] [facility] Provider          [facility] ED  EMERGENCY DEPARTMENT ENCOUNTER        Pt Name: Paul Fernandez  MRN: [id number]  [place] 1990  Dateof evaluation: 12/22/2020  Provider: ARIEL Javed - CNP  PCP: Dama Prader, DO  ED Attending: No att. providers found    [address]       Chief Complaint   Patient presents with    Abscess       HISTORY OF PRESENTILLNESS   (Location/Symptom, Timing/Onset, Context/Setting, Quality, Duration, Modifying Factors, Severity)  Note limiting factors. Paul Fernandez is a 30 y.o. female for right forearm abscess. Onset was today. Context includes pt states she had been clean from iv drug but relapsed yesterday and now has an abscess to right forearm. Pt is right hand dominant and is not sure about her tetanus. Alleviating factors include nothing. Aggravating factors include nothing. Pain is 7/10. nothing has been used for pain today. Nursing Notes were all reviewed and agreed with or any disagreements were addressed  in the HPI. REVIEW OF SYSTEMS    (2-9 systems for level 4, 10 or more for level 5)     Review of Systems   Constitutional: Negative for fever. HENT: Negative for congestion, rhinorrhea and sore throat. Respiratory: Negative for shortness of breath. Cardiovascular: Negative for chest pain. Gastrointestinal: Negative for abdominal pain. Genitourinary: Negative for decreased urine volume and difficulty urinating. Musculoskeletal: Negative for arthralgias and myalgias. Skin: Positive for wound. Negative for color change and rash. Neurological: Negative for dizziness and light-headedness. Psychiatric/Behavioral: Negative for agitation. All other systems reviewed and are negative. Positives and Pertinent negatives as per HPI. Except as noted above in the ROS, all other systems were reviewed and negative.        PAST MEDICAL HISTORY     Past Medical History:   Diagnosis Date    Anemia     Anxiety     Bipolar disorder ([facility])     Declines CF and Quad screen 12/19/2011    Drug use 12/19/2011    Hx of Marijuana use Positive amphetamine drug screen-08/03/2012     Hepatitis C     Hx of amniocentesis July, 2012    Kidney stone     Migraines     worsened with pregnancy, takes Reglan for    [facility] (premature atrial contraction)     baby has PAC's, will need EKG after delivery    Smokes 1-2 cigs/day 12/19/2011    Pt quit smoking, blanket was given. SURGICAL HISTORY       Past Surgical History:   Procedure Laterality Date    CHOLECYSTECTOMY  9/25/2014    laparoscopic    KNEE ARTHROSCOPY  2008, 2009    2  rt. knee surg, torn meniscus    KNEE ARTHROSCOPY Right 1/2/13    Video arthroscopy with partial medial and lateral meniscectomy, chondroplasty, removal of loose bodies and medial plica excision    WISDOM TOOTH EXTRACTION           CURRENT MEDICATIONS       Discharge Medication List as of 12/23/2020  1:59 AM      CONTINUE these medications which have NOT CHANGED    Details   sertraline (ZOLOFT) 100 MG tablet Take 100 mg by mouth dailyHistorical Med      buprenorphine (SUBUTEX) 8 MG SUBL SL tablet Place 20 mg under the tongue daily. Historical Med               ALLERGIES     Meloxicam, Metoclopramide, and Voltaren [diclofenac sodium]    FAMILY HISTORY       Family History   Problem Relation Age of Onset    Depression Mother     Thyroid Disease Maternal Grandmother     Arthritis Maternal Grandmother     Hypertension Maternal Grandfather           SOCIAL HISTORY       Social History     Socioeconomic History    Marital status: Single     Spouse name: None    Number of children: 1    Years of education: None    Highest education level: None   Occupational History     Comment: [employer]   Social Needs    Financial resource strain: None    Food insecurity     Worry: None     Inability: None    Transportation needs     Medical: None     Non-medical: None   Tobacco Use    Smoking status: Former Smoker     Types: Cigarettes     Quit date: 2018     Years since quittin.0    Smokeless tobacco: Former User   Substance and Sexual Activity    Alcohol use: No    Drug use: Yes     Types: IV, Opiates      Comment: relapse 1 week ago     Sexual activity: Yes     Partners: Male   Lifestyle    Physical activity     Days per week: None     Minutes per session: None    Stress: None   Relationships    Social connections     Talks on phone: None     Gets together: None     Attends Rastafari service: None     Active member of club or organization: None     Attends meetings of clubs or organizations: None     Relationship status: None    Intimate partner violence     Fear of current or ex partner: None     Emotionally abused: None     Physically abused: None     Forced sexual activity: None   Other Topics Concern    None   Social History Narrative    None       SCREENINGS             PHYSICAL EXAM  (up to 7 for level 4, 8 or more for level 5)     ED Triage Vitals [207]   BP Temp Temp Source Pulse Resp SpO2 Height Weight   104/67 97.6 °F (36.4 °C) Temporal 68 16 100 % -- 170 lb (77.1 kg)       Physical Exam  Constitutional:       Appearance: She is well-developed. HENT:      Head: Normocephalic and atraumatic. Neck:      Musculoskeletal: Normal range of motion. Cardiovascular:      Rate and Rhythm: Normal rate. Pulmonary:      Effort: Pulmonary effort is normal. No respiratory distress. Abdominal:      General: There is no distension. Palpations: Abdomen is soft. Tenderness: There is no abdominal tenderness. Musculoskeletal: Normal range of motion. Skin:     General: Skin is warm and dry. Findings: Erythema present. Neurological:      Mental Status: She is alert and oriented to person, place, and time.          DIAGNOSTIC RESULTS   LABS:    Labs Reviewed   CBC WITH AUTO DIFFERENTIAL - Abnormal; Notable for the following components:       Result Value    MCV 76.5 (*)     MCH 25.1 (*)     All other components within normal limits    Narrative:     Performed at:  [address] [place] [address],  [place], [employer]   Phone [phone]   COMPREHENSIVE METABOLIC PANEL W/ REFLEX TO MG FOR LOW K - Abnormal; Notable for the following components:    Sodium 135 (*)     Chloride 97 (*)     Glucose 105 (*)     Total Protein 8.5 (*)     Albumin/Globulin Ratio 0.9 (*)     All other components within normal limits    Narrative:     Performed at:  [facility],  [place], [employer]   Phone [phone]   SEDIMENTATION RATE - Abnormal; Notable for the following components:    Sed Rate 28 (*)     All other components within normal limits    Narrative:     Performed at:  [address] [address],  [place], [employer]   Phone [phone]   CULTURE, BLOOD 1    Narrative:     ORDER#: [id number]                          ORDERED BY: Shala De La Paz  SOURCE: Blood                              COLLECTED:  12/23/20 00:05  ANTIBIOTICS AT BRIGID.:                      RECEIVED :  12/23/20 08:35  If child <=2 yrs old please draw pediatric bottle. ~Blood Culture #1  Performed at:  [address]   Phone [phone]   HCG, SERUM, QUALITATIVE    Narrative:     Performed at:  [address] [address],  [place], [facility]   Phone [phone]   LACTATE, SEPSIS    Narrative:     Performed at:  [address] [address],  [place], [employer]   Phone [phone]   PROCALCITONIN    Narrative:     Performed at:  [address] [address],  [place], [employer]   Phone [phone]       All other labs werewithin normal range or not returned as of this dictation. EKG: All EKG's are interpreted by the Emergency Department Physician who either signs or Co-signs this chart in the absence of a cardiologist.  Please see their note for interpretation of EKG. RADIOLOGY:     Right radius ulna x-ray interpreted by radiologist for  FINDINGS:   There is no evidence of acute fracture.  There is normal alignment.  No acute   joint abnormality.  No focal osseous lesion. Kel Gates is a focal soft tissue   prominence along the proximal volar forearm. Interpretation per the Radiologist below, if available at the time of this note:    XR RADIUS ULNA RIGHT (2 VIEWS)   Final Result   No acute osseous abnormality. Focal soft tissue prominence along the proximal forearm           No results found. PROCEDURES   Unless otherwise noted below, none     Procedures     CRITICAL CARE TIME   N/A    CONSULTS:  None      EMERGENCYDEPARTMENT COURSE and DIFFERENTIAL DIAGNOSIS/MDM:   Vitals:    Vitals:    12/22/20 2147   BP: 104/67   Pulse: 68   Resp: 16   Temp: 97.6 °F (36.4 °C)   TempSrc: Temporal   SpO2: 100%   Weight: 170 lb (77.1 kg)       Patient was given the following medications:  Medications   cefepime (MAXIPIME) 2 g IVPB minibag (0 g Intravenous Stopped 12/23/20 0025)   vancomycin 1.5 g in dextrose 5% 300 mL IVPB (0 mg Intravenous Stopped 12/23/20 0159)   Tetanus-Diphth-Acell Pertussis (BOOSTRIX) injection 0.5 mL (0.5 mLs Intramuscular Given 12/23/20 0003)       Patient was seen and evaluated by myself. Patient here for right-sided abscess to her arm. Patient reports that she has been clean from IV drugs however recently relapsed. Patient is unsure about her tetanus status. She does have an abscess with some cellulitis noted to her right forearm. Lab values have been reviewed and interpreted. Lactic was 1 and her WBCs were 8. Patient was provided with IV antibiotics and a Boostrix. See procedure note for I&D. Patient tolerated procedure well.   She will be given IV antibiotics in the ED and should be discharged with oral antibiotics. She was given instructions for [facility] and instructions to follow-up with her primary care doctor in the next few days. Patient was encouraged to return to the ED for worsening symptoms. Patient was ultimately discharged home with all questions answered. The patient tolerated their visit well. I have evaluated this patient. My supervising physician was available for consultation. The patient and / or the family were informed of the results of any tests, a time was given to answer questions, a plan was proposed and they agreed with plan. FINAL IMPRESSION      1. Abscess    2. Cellulitis of right upper extremity    3.  History of intravenous drug abuse [facility])          DISPOSITION/PLAN   DISPOSITION        PATIENT REFERRED TO:  Pedrito Cotton DO  [address] [place] [address]  [phone]    Schedule an appointment as soon as possible for a visit in 2 days  for re-evaluation    [facility] [place] [facility]  [address] [facility]  [phone]    If symptoms worsen    Eliseo  call [phone] to schedule an appointment  Call today      [facility]  [address]  [phone]    for re-evaluation      DISCHARGE MEDICATIONS:  Discharge Medication List as of 12/23/2020  1:59 AM      START taking these medications    Details   sulfamethoxazole-trimethoprim (BACTRIM DS) 800-160 MG per tablet Take 1 tablet by mouth 2 times daily for 10 days, Disp-20 tablet, R-0Print      cephALEXin (KEFLEX) 500 MG capsule Take 1 capsule by mouth 4 times daily for 10 days, Disp-40 capsule, R-0Print             DISCONTINUED MEDICATIONS:  Discharge Medication List as of 12/23/2020  1:59 AM      STOP taking these medications       metoclopramide (REGLAN) 10 MG tablet Comments:   Reason for Stopping:         acetaminophen (AMINOFEN) 325 MG tablet Comments:   Reason for Stopping:         venlafaxine (EFFEXOR) 75 MG tablet Comments:   Reason for Stopping:         Buprenorphine HCl-Naloxone HCl (SUBOXONE SL) Comments:   Reason for Stopping:                      (Please note that portions of this note were completed with a voice recognition program.  Efforts were made to edit the dictations but occasionally words are mis-transcribed.)    ARIEL Oneal CNP (electronically signed)         ARIEL Oneal CNP  12/24/20 2887

## 2020-12-27 LAB — BLOOD CULTURE, ROUTINE: NORMAL

## 2021-03-19 ENCOUNTER — APPOINTMENT (OUTPATIENT)
Dept: ULTRASOUND IMAGING | Age: 31
End: 2021-03-19
Payer: COMMERCIAL

## 2021-03-19 ENCOUNTER — HOSPITAL ENCOUNTER (EMERGENCY)
Age: 31
Discharge: HOME | End: 2021-03-19
Attending: EMERGENCY MEDICINE
Payer: COMMERCIAL

## 2021-03-19 VITALS
DIASTOLIC BLOOD PRESSURE: 80 MMHG | OXYGEN SATURATION: 100 % | TEMPERATURE: 98.3 F | SYSTOLIC BLOOD PRESSURE: 117 MMHG | HEART RATE: 79 BPM | WEIGHT: 160 LBS | RESPIRATION RATE: 14 BRPM | BODY MASS INDEX: 25.06 KG/M2

## 2021-03-19 DIAGNOSIS — O46.90 VAGINAL BLEEDING IN PREGNANCY: ICD-10-CM

## 2021-03-19 DIAGNOSIS — Z67.90 BLOOD TYPE, RH POSITIVE: ICD-10-CM

## 2021-03-19 DIAGNOSIS — O20.0 THREATENED MISCARRIAGE IN EARLY PREGNANCY: Primary | ICD-10-CM

## 2021-03-19 LAB
A/G RATIO: 1.4 (ref 1.1–2.2)
ABO/RH: NORMAL
ALBUMIN SERPL-MCNC: 4.5 G/DL (ref 3.4–5)
ALP BLD-CCNC: 98 U/L (ref 40–129)
ALT SERPL-CCNC: 17 U/L (ref 10–40)
ANION GAP SERPL CALCULATED.3IONS-SCNC: 9 MMOL/L (ref 3–16)
AST SERPL-CCNC: 20 U/L (ref 15–37)
BACTERIA: ABNORMAL /HPF
BASOPHILS ABSOLUTE: 0.1 K/UL (ref 0–0.2)
BASOPHILS RELATIVE PERCENT: 0.7 %
BILIRUB SERPL-MCNC: 0.7 MG/DL (ref 0–1)
BILIRUBIN URINE: NEGATIVE
BLOOD, URINE: ABNORMAL
BUN BLDV-MCNC: 12 MG/DL (ref 7–20)
CALCIUM SERPL-MCNC: 9.8 MG/DL (ref 8.3–10.6)
CHLORIDE BLD-SCNC: 101 MMOL/L (ref 99–110)
CLARITY: CLEAR
CO2: 26 MMOL/L (ref 21–32)
COLOR: YELLOW
CREAT SERPL-MCNC: 0.7 MG/DL (ref 0.6–1.1)
EOSINOPHILS ABSOLUTE: 0.3 K/UL (ref 0–0.6)
EOSINOPHILS RELATIVE PERCENT: 2.8 %
EPITHELIAL CELLS, UA: ABNORMAL /HPF (ref 0–5)
GFR AFRICAN AMERICAN: >60
GFR NON-AFRICAN AMERICAN: >60
GLOBULIN: 3.2 G/DL
GLUCOSE BLD-MCNC: 73 MG/DL (ref 70–99)
GLUCOSE URINE: NEGATIVE MG/DL
GONADOTROPIN, CHORIONIC (HCG) QUANT: 4487 MIU/ML
HCT VFR BLD CALC: 42.8 % (ref 36–48)
HEMOGLOBIN: 14.5 G/DL (ref 12–16)
KETONES, URINE: NEGATIVE MG/DL
LEUKOCYTE ESTERASE, URINE: NEGATIVE
LYMPHOCYTES ABSOLUTE: 2.9 K/UL (ref 1–5.1)
LYMPHOCYTES RELATIVE PERCENT: 30.5 %
MCH RBC QN AUTO: 26.1 PG (ref 26–34)
MCHC RBC AUTO-ENTMCNC: 33.9 G/DL (ref 31–36)
MCV RBC AUTO: 77.1 FL (ref 80–100)
MICROSCOPIC EXAMINATION: YES
MONOCYTES ABSOLUTE: 0.8 K/UL (ref 0–1.3)
MONOCYTES RELATIVE PERCENT: 8 %
MUCUS: ABNORMAL /LPF
NEUTROPHILS ABSOLUTE: 5.5 K/UL (ref 1.7–7.7)
NEUTROPHILS RELATIVE PERCENT: 58 %
NITRITE, URINE: NEGATIVE
PDW BLD-RTO: 15.4 % (ref 12.4–15.4)
PH UA: 6 (ref 5–8)
PLATELET # BLD: 271 K/UL (ref 135–450)
PMV BLD AUTO: 8.5 FL (ref 5–10.5)
POTASSIUM REFLEX MAGNESIUM: 4 MMOL/L (ref 3.5–5.1)
PROTEIN UA: NEGATIVE MG/DL
RBC # BLD: 5.55 M/UL (ref 4–5.2)
RBC UA: ABNORMAL /HPF (ref 0–4)
REASON FOR REJECTION: NORMAL
REJECTED TEST: NORMAL
SODIUM BLD-SCNC: 136 MMOL/L (ref 136–145)
SPECIFIC GRAVITY UA: >=1.03 (ref 1–1.03)
TOTAL PROTEIN: 7.7 G/DL (ref 6.4–8.2)
URINE TYPE: ABNORMAL
UROBILINOGEN, URINE: 0.2 E.U./DL
WBC # BLD: 9.4 K/UL (ref 4–11)
WBC UA: ABNORMAL /HPF (ref 0–5)

## 2021-03-19 PROCEDURE — 81001 URINALYSIS AUTO W/SCOPE: CPT

## 2021-03-19 PROCEDURE — 86900 BLOOD TYPING SEROLOGIC ABO: CPT

## 2021-03-19 PROCEDURE — 84702 CHORIONIC GONADOTROPIN TEST: CPT

## 2021-03-19 PROCEDURE — 76801 OB US < 14 WKS SINGLE FETUS: CPT

## 2021-03-19 PROCEDURE — 6370000000 HC RX 637 (ALT 250 FOR IP): Performed by: PHYSICIAN ASSISTANT

## 2021-03-19 PROCEDURE — 86901 BLOOD TYPING SEROLOGIC RH(D): CPT

## 2021-03-19 PROCEDURE — 80053 COMPREHEN METABOLIC PANEL: CPT

## 2021-03-19 PROCEDURE — 85025 COMPLETE CBC W/AUTO DIFF WBC: CPT

## 2021-03-19 PROCEDURE — 99285 EMERGENCY DEPT VISIT HI MDM: CPT

## 2021-03-19 RX ORDER — ONDANSETRON 4 MG/1
4 TABLET, ORALLY DISINTEGRATING ORAL ONCE
Status: COMPLETED | OUTPATIENT
Start: 2021-03-19 | End: 2021-03-19

## 2021-03-19 RX ORDER — SENNOSIDES 8.6 MG
650 CAPSULE ORAL EVERY 8 HOURS
Qty: 30 TABLET | Refills: 0 | Status: SHIPPED | OUTPATIENT
Start: 2021-03-19 | End: 2021-04-16

## 2021-03-19 RX ORDER — ACETAMINOPHEN 325 MG/1
650 TABLET ORAL ONCE
Status: COMPLETED | OUTPATIENT
Start: 2021-03-19 | End: 2021-03-19

## 2021-03-19 RX ORDER — 0.9 % SODIUM CHLORIDE 0.9 %
1000 INTRAVENOUS SOLUTION INTRAVENOUS ONCE
Status: DISCONTINUED | OUTPATIENT
Start: 2021-03-19 | End: 2021-03-19

## 2021-03-19 RX ORDER — ONDANSETRON 2 MG/ML
4 INJECTION INTRAMUSCULAR; INTRAVENOUS ONCE
Status: DISCONTINUED | OUTPATIENT
Start: 2021-03-19 | End: 2021-03-19

## 2021-03-19 RX ADMIN — ACETAMINOPHEN 650 MG: 325 TABLET ORAL at 14:24

## 2021-03-19 RX ADMIN — ONDANSETRON 4 MG: 4 TABLET, ORALLY DISINTEGRATING ORAL at 14:24

## 2021-03-19 ASSESSMENT — ENCOUNTER SYMPTOMS
NAUSEA: 1
ABDOMINAL PAIN: 0
COUGH: 0
SHORTNESS OF BREATH: 0
COLOR CHANGE: 0
VOMITING: 0

## 2021-03-19 ASSESSMENT — PAIN SCALES - GENERAL
PAINLEVEL_OUTOF10: 4
PAINLEVEL_OUTOF10: 5

## 2021-03-19 ASSESSMENT — PAIN DESCRIPTION - PAIN TYPE: TYPE: ACUTE PAIN

## 2021-03-19 ASSESSMENT — PAIN DESCRIPTION - LOCATION: LOCATION: ABDOMEN

## 2021-03-19 NOTE — ED NOTES
Pt DC in 's office custody in good condition with RX x__1__ with outpatient lab order and follow up with OBGYN as needed. V/u of Dc instructions. Denies questions or concerns. Teaching done re: s/s to report.        Di Mckeon, RN  03/19/21 2473

## 2021-03-19 NOTE — ED NOTES
[address] [facility] @ 541  Re: threatened miscarriage  Contacted Dr Watson Ruvalcaba @ [address] [facility] Carter Colunga  03/19/21 2240

## 2021-03-19 NOTE — ED NOTES
Bed: 24  Expected date: 3/19/21  Expected time: 1:20 PM  Means of arrival: [facility]  Comments:  You Flores RN  03/19/21 5254

## 2021-03-19 NOTE — ED NOTES
RN rounded on pt. Pt medicated for pain and nausea and updated on plan of care. Pt has no further needs at this time. Pt resting in bed, call light within reach. Pt denies any questions.         Myra Díaz RN  03/19/21 1933

## 2021-03-19 NOTE — ED PROVIDER NOTES
I independently performed a history and physical on Carlos Foy. All diagnostic, treatment, and disposition decisions were made by myself in conjunction with the mid-level provider. For further details of [facility] emergency department encounter, please see ASH Mckoy's documentation. Patient presents from [facility] with complaints of vaginal bleeding in early pregnancy. Patient states that she is approximately 6 weeks pregnant per her best guess. She has a  AB 3 female who is currently not established with OB/GYN. Patient has noted some mild vaginal bleeding and pelvic cramping over the last day or so. Physical exam: General: No acute distress. Head: Normocephalic/atraumatic. Heart: Regular rhythm. Normal S1-S2. Abdomen: Soft. Nontender nondistended. No rebound, guarding. : Please see PA note. 1. Threatened miscarriage in early pregnancy    2. Vaginal bleeding in pregnancy    3.  Blood type, Rh positive           Arleen Clemons DO  [phone]

## 2021-03-19 NOTE — ED NOTES
Patient to ED via EMS with c/o vaginal bleeding in pregnancy with suprapubic tenderness. Reports her last period was in January 2021. Reports she has saturated 4 cecille pads in 2 days. Reports she does have some nausea as well. Pt alert and oriented, VSS and as charted and without distress.  at bedside. Bed in lowest position with side rails up x1 to right side. Call light within reach.       Patricia Barber RN  03/19/21 [id number]

## 2021-03-19 NOTE — ED NOTES
Writer rounded on patient at this time. Pelvic exam performed by Quinten ALEMAN with writer at bedside. Patient ambulated to bathroom to provide urine sample, urine sent to lab at this time. Vitals obtained and charted. Patient denies further needs at this time. Will continue to monitor.      Salomon Fam RN  03/19/21 6047

## 2021-03-19 NOTE — ED NOTES
RN rounded on pt. VSS and as charted. Pt straight stuck for repeat labs. Pt updated on plan of care.  remains at bedside. Pt has no further needs at this time. Pt resting in bed, call light within reach. Pt denies any questions.         Oneyda Mosqueda RN  03/19/21 0035

## 2021-03-19 NOTE — ED NOTES
This RN and Elver Taveras RN attempted IV without success. Elver Taveras RN attempting with ultrasound.       Zuly Haider RN  03/19/21 8155

## 2021-04-09 ENCOUNTER — HOSPITAL ENCOUNTER (EMERGENCY)
Age: 31
Discharge: HOME | End: 2021-04-09
Attending: EMERGENCY MEDICINE
Payer: COMMERCIAL

## 2021-04-09 ENCOUNTER — APPOINTMENT (OUTPATIENT)
Dept: ULTRASOUND IMAGING | Age: 31
End: 2021-04-09
Payer: COMMERCIAL

## 2021-04-09 VITALS
SYSTOLIC BLOOD PRESSURE: 116 MMHG | HEIGHT: 67 IN | OXYGEN SATURATION: 100 % | BODY MASS INDEX: 27 KG/M2 | DIASTOLIC BLOOD PRESSURE: 81 MMHG | TEMPERATURE: 98.6 F | HEART RATE: 66 BPM | RESPIRATION RATE: 18 BRPM | WEIGHT: 172 LBS

## 2021-04-09 DIAGNOSIS — O46.90 VAGINAL BLEEDING IN PREGNANCY: ICD-10-CM

## 2021-04-09 DIAGNOSIS — O20.0 THREATENED MISCARRIAGE: Primary | ICD-10-CM

## 2021-04-09 LAB
ANION GAP SERPL CALCULATED.3IONS-SCNC: 9 MMOL/L (ref 3–16)
BACTERIA: ABNORMAL /HPF
BASOPHILS ABSOLUTE: 0 K/UL (ref 0–0.2)
BASOPHILS RELATIVE PERCENT: 0.4 %
BILIRUBIN URINE: NEGATIVE
BLOOD, URINE: ABNORMAL
BUN BLDV-MCNC: 9 MG/DL (ref 7–20)
CALCIUM SERPL-MCNC: 9.8 MG/DL (ref 8.3–10.6)
CHLORIDE BLD-SCNC: 99 MMOL/L (ref 99–110)
CLARITY: CLEAR
CO2: 27 MMOL/L (ref 21–32)
COLOR: YELLOW
CREAT SERPL-MCNC: 0.6 MG/DL (ref 0.6–1.1)
EOSINOPHILS ABSOLUTE: 0 K/UL (ref 0–0.6)
EOSINOPHILS RELATIVE PERCENT: 1.1 %
EPITHELIAL CELLS, UA: ABNORMAL /HPF (ref 0–5)
GFR AFRICAN AMERICAN: >60
GFR NON-AFRICAN AMERICAN: >60
GLUCOSE BLD-MCNC: 88 MG/DL (ref 70–99)
GLUCOSE URINE: NEGATIVE MG/DL
GONADOTROPIN, CHORIONIC (HCG) QUANT: NORMAL MIU/ML
HCG QUALITATIVE: POSITIVE
HCT VFR BLD CALC: 39.7 % (ref 36–48)
HEMOGLOBIN: 13.5 G/DL (ref 12–16)
KETONES, URINE: NEGATIVE MG/DL
LEUKOCYTE ESTERASE, URINE: ABNORMAL
LYMPHOCYTES ABSOLUTE: 1.2 K/UL (ref 1–5.1)
LYMPHOCYTES RELATIVE PERCENT: 34.1 %
MCH RBC QN AUTO: 27.6 PG (ref 26–34)
MCHC RBC AUTO-ENTMCNC: 34.1 G/DL (ref 31–36)
MCV RBC AUTO: 80.9 FL (ref 80–100)
MICROSCOPIC EXAMINATION: YES
MONOCYTES ABSOLUTE: 0.3 K/UL (ref 0–1.3)
MONOCYTES RELATIVE PERCENT: 9.8 %
MUCUS: ABNORMAL /LPF
NEUTROPHILS ABSOLUTE: 1.9 K/UL (ref 1.7–7.7)
NEUTROPHILS RELATIVE PERCENT: 54.6 %
NITRITE, URINE: NEGATIVE
PDW BLD-RTO: 16.4 % (ref 12.4–15.4)
PH UA: 6.5 (ref 5–8)
PLATELET # BLD: 191 K/UL (ref 135–450)
PMV BLD AUTO: 8.3 FL (ref 5–10.5)
POTASSIUM REFLEX MAGNESIUM: 3.9 MMOL/L (ref 3.5–5.1)
PROTEIN UA: NEGATIVE MG/DL
RBC # BLD: 4.91 M/UL (ref 4–5.2)
RBC UA: ABNORMAL /HPF (ref 0–4)
SODIUM BLD-SCNC: 135 MMOL/L (ref 136–145)
SPECIFIC GRAVITY UA: 1.02 (ref 1–1.03)
URINE REFLEX TO CULTURE: ABNORMAL
URINE TYPE: ABNORMAL
UROBILINOGEN, URINE: 0.2 E.U./DL
WBC # BLD: 3.5 K/UL (ref 4–11)
WBC UA: ABNORMAL /HPF (ref 0–5)
YEAST: PRESENT /HPF

## 2021-04-09 PROCEDURE — 81001 URINALYSIS AUTO W/SCOPE: CPT

## 2021-04-09 PROCEDURE — 99284 EMERGENCY DEPT VISIT MOD MDM: CPT

## 2021-04-09 PROCEDURE — 84703 CHORIONIC GONADOTROPIN ASSAY: CPT

## 2021-04-09 PROCEDURE — 76817 TRANSVAGINAL US OBSTETRIC: CPT

## 2021-04-09 PROCEDURE — 6370000000 HC RX 637 (ALT 250 FOR IP): Performed by: EMERGENCY MEDICINE

## 2021-04-09 PROCEDURE — 80048 BASIC METABOLIC PNL TOTAL CA: CPT

## 2021-04-09 PROCEDURE — 6370000000 HC RX 637 (ALT 250 FOR IP)

## 2021-04-09 PROCEDURE — 84702 CHORIONIC GONADOTROPIN TEST: CPT

## 2021-04-09 PROCEDURE — 85025 COMPLETE CBC W/AUTO DIFF WBC: CPT

## 2021-04-09 PROCEDURE — 76801 OB US < 14 WKS SINGLE FETUS: CPT

## 2021-04-09 RX ORDER — CEFDINIR 300 MG/1
CAPSULE ORAL
Status: COMPLETED
Start: 2021-04-09 | End: 2021-04-09

## 2021-04-09 RX ORDER — CEFDINIR 300 MG/1
300 CAPSULE ORAL DAILY
Status: COMPLETED | OUTPATIENT
Start: 2021-04-09 | End: 2021-04-09

## 2021-04-09 RX ORDER — CEFDINIR 300 MG/1
300 CAPSULE ORAL EVERY 12 HOURS SCHEDULED
Status: DISCONTINUED | OUTPATIENT
Start: 2021-04-09 | End: 2021-04-09

## 2021-04-09 RX ORDER — CEFDINIR 300 MG/1
300 CAPSULE ORAL 2 TIMES DAILY
Qty: 14 CAPSULE | Refills: 0 | Status: SHIPPED | OUTPATIENT
Start: 2021-04-09 | End: 2021-04-16

## 2021-04-09 RX ORDER — ACETAMINOPHEN 325 MG/1
650 TABLET ORAL ONCE
Status: COMPLETED | OUTPATIENT
Start: 2021-04-09 | End: 2021-04-09

## 2021-04-09 RX ADMIN — ACETAMINOPHEN 650 MG: 325 TABLET ORAL at 13:57

## 2021-04-09 RX ADMIN — CEFDINIR 300 MG: 300 CAPSULE ORAL at 17:29

## 2021-04-09 ASSESSMENT — PAIN SCALES - GENERAL: PAINLEVEL_OUTOF10: 6

## 2021-04-09 ASSESSMENT — PAIN DESCRIPTION - PAIN TYPE: TYPE: ACUTE PAIN

## 2021-04-09 NOTE — ED PROVIDER NOTES
Patient handed off to me at shift change pending ultrasound results. In summary, patient is a 32yoF who presents to the ED for vaginal bleeding. On my assessment, patient was resting comfortably, hemodynamically stable, and satting well on room air. Abdomen was soft, non distended, and non tender to palpation throughout. Ultrasound showed gestational age uterus adjacent to the right cornua. Patient given first dose of Omnicef in emergency department. She was instructed to follow-up with OB/GYN. Discussed with DWD at bedside the patient will need close follow-up for a follow-up ultrasound and OB/GYN visit. They both verbalized understanding. Discharge instructions were written. Patient prescribed Omnicef for asymptomatic bacteriuria. Discharge.      Trina Jhaveri MD  04/11/21 2793

## 2021-04-09 NOTE — ED NOTES
Discharge instructions given, pt verbalized understanding. Discussed follow-up appointments and medications. No questions or concerns at this time. Pt escorted from ED by deputy in custody. Pt discharged with 1 prescription.       Livia Gabriel RN  04/09/21 6762

## 2021-04-09 NOTE — ED NOTES
Pt returned to room from [address].       Russell Lal RN  04/09/21 7944

## 2021-04-09 NOTE — ED NOTES
Pt to [address] via stretcher, deputy accompanying pt.       Ildefonso Yoo, EMERALD  04/09/21 5607

## 2021-04-09 NOTE — ED PROVIDER NOTES
[address] ED  EMERGENCY DEPARTMENT ENCOUNTER      Pt Name: Steven Sepulveda  MRN: [id number]  [place] 1990  Date of evaluation: 4/9/2021  Provider: Zackery Price MD    CHIEF COMPLAINT       Chief Complaint   Patient presents with    Vaginal Bleeding     patient states she is 8 weeks pregnant and started bleeding and c/o abdominal cramping that started 1 hour PTA. HISTORY OF PRESENT ILLNESS   (Location/Symptom, Timing/Onset, Context/Setting, Quality, Duration, Modifying Factors, Severity)  Note limiting factors. Steven Sepulveda is a 35 y.o. female currently 8 weeks pregnant yet to have ultrasound here today with vaginal bleeding and some pelvic cramping. Patient currently incarcerated. Believes that she is 8 weeks pregnant based on LMP and last sexual encounter. Is yet to have any prenatal care but has been taking prenatal vitamins. Notes that she recently started to have suprapubic abdominal cramping and some intermittent spotting/vaginal bleeding. Bleeding is minimal.  Denies fevers or chills. No vomiting. No dysuria or hematuria. Pain mild to moderate. HPI    Nursing Notes were reviewed. REVIEW OF SYSTEMS    (2-9 systems for level 4, 10 or more for level 5)     Review of Systems    Please see HPI for pertinent positive and negative review of system findings. A full 10 system ROS was performed and otherwise negative. PAST MEDICAL HISTORY     Past Medical History:   Diagnosis Date    Anemia     Anxiety     Bipolar disorder ([address]     Declines CF and Quad screen 12/19/2011    Drug use 12/19/2011    Hx of Marijuana use Positive amphetamine drug screen-08/03/2012     Hepatitis C     Hx of amniocentesis July, 2012    Kidney stone     Migraines     worsened with pregnancy, takes Reglan for    [facility] (premature atrial contraction)     baby has PAC's, will need EKG after delivery    Smokes 1-2 cigs/day 12/19/2011    Pt quit smoking, blanket was given. SURGICAL HISTORY       Past Surgical History:   Procedure Laterality Date    CHOLECYSTECTOMY  2014    laparoscopic    KNEE ARTHROSCOPY  2008, 2009    2  rt. knee surg, torn meniscus    KNEE ARTHROSCOPY Right 13    Video arthroscopy with partial medial and lateral meniscectomy, chondroplasty, removal of loose bodies and medial plica excision    WISDOM TOOTH EXTRACTION           CURRENT MEDICATIONS       Discharge Medication List as of 2021  5:12 PM      CONTINUE these medications which have NOT CHANGED    Details   Prenatal MV-Min-Fe Fum-FA-DHA (PRENATAL 1 PO) Take by mouthHistorical Med      acetaminophen (TYLENOL 8 HOUR) 650 MG extended release tablet Take 1 tablet by mouth every 8 hours for 30 doses, Disp-30 tablet, R-0Print      buprenorphine (SUBUTEX) 8 MG SUBL SL tablet Place 20 mg under the tongue daily. Historical Med             ALLERGIES     Meloxicam, Metoclopramide, and Voltaren [diclofenac sodium]    FAMILY HISTORY       Family History   Problem Relation Age of Onset    Depression Mother     Thyroid Disease Maternal Grandmother     Arthritis Maternal Grandmother     Hypertension Maternal Grandfather           SOCIAL HISTORY       Social History     Socioeconomic History    Marital status: Single     Spouse name: None    Number of children: 1    Years of education: None    Highest education level: None   Occupational History     Comment: [employer]   Social Needs    Financial resource strain: None    Food insecurity     Worry: None     Inability: None    Transportation needs     Medical: None     Non-medical: None   Tobacco Use    Smoking status: Former Smoker     Types: Cigarettes     Quit date: 2018     Years since quittin.3    Smokeless tobacco: Former User   Substance and Sexual Activity    Alcohol use: No    Drug use: Yes     Types: IV, Opiates      Comment: relapse 1 week ago     Sexual activity: Yes     Partners: Male   Lifestyle    Physical activity     Days per week: None     Minutes per session: None    Stress: None   Relationships    Social connections     Talks on phone: None     Gets together: None     Attends Muslim service: None     Active member of club or organization: None     Attends meetings of clubs or organizations: None     Relationship status: None    Intimate partner violence     Fear of current or ex partner: None     Emotionally abused: None     Physically abused: None     Forced sexual activity: None   Other Topics Concern    None   Social History Narrative    None       SCREENINGS    Royal Coma Scale  Eye Opening: Spontaneous  Best Verbal Response: Oriented  Best Motor Response: Obeys commands  Ileana Coma Scale Score: 15          PHYSICAL EXAM    (up to 7 for level 4, 8 or more for level 5)     ED Triage Vitals   BP Temp Temp Source Pulse Resp SpO2 Height Weight   04/09/21 1254 04/09/21 1257 04/09/21 1257 04/09/21 1254 04/09/21 1254 04/09/21 1254 04/09/21 1254 04/09/21 1254   111/77 98.6 °F (37 °C) Oral 77 18 100 % 5' 7\" (1.702 m) 172 lb (78 kg)       Physical Exam    General appearance:  Cooperative. No acute distress. Skin:  Warm. Dry. Eye:  Extraocular movements intact. Ears, nose, mouth and throat:  Oral mucosa moist,  Neck:  Trachea midline. Heart:  Regular rate and rhythm  Perfusion:  intact  Respiratory:  Lungs clear to auscultation bilaterally. Respirations nonlabored. Abdominal:   Non distended. Nontender  : Normal-appearing external female genitalia. Vaginal mucosa pink and moist.  No fundal or adnexal tenderness/masses. No cervical motion tenderness. Small amount of blood in the vaginal vault  Neurological:  Alert and oriented x 3.   Moves all extremities spontaneously  Musculoskeletal:   Normal ROM, no deformities          Psychiatric:  Normal mood      DIAGNOSTIC RESULTS       Labs Reviewed   CBC WITH AUTO DIFFERENTIAL - Abnormal; Notable for the following components:       Result Result   Gestation in the region of the right fundus/cornua is suboptimally   visualized. Follow-up ultrasound recommended. Other findings as described. All other labs/imaging were within normal range or not returned as of this dictation. EMERGENCY DEPARTMENT COURSE and DIFFERENTIAL DIAGNOSIS/MDM:   Vitals:    Vitals:    04/09/21 1254 04/09/21 1257 04/09/21 1345 04/09/21 1715   BP: 111/77  120/65 116/81   Pulse: 77  70 66   Resp: 18  18 18   Temp:  98.6 °F (37 °C)     TempSrc:  Oral     SpO2: 100%  100% 100%   Weight: 172 lb (78 kg)      Height: 5' 7\" (1.702 m)          Patient presents emergency department today complaining of suprapubic abdominal cramping and vaginal bleeding. hCG 13,595. Hemoglobin stable. Rh+ from prior testing. Overall abdomen soft. Ultrasound is pending at this time and will be signed out to the oncoming physician for review as well results of the patient's urinalysis. She is resting comfortably at this time and should she have confirmed IUP suspect she likely can be discharged home. Please see the oncoming physician's note for further details regarding her disposition    MDM    CONSULTS     None    Critical Care:   None    REASSESSMENT          PROCEDURE     Unless otherwise noted below, none     Procedures      FINAL IMPRESSION      1. Threatened miscarriage    2. Vaginal bleeding in pregnancy            DISPOSITION/PLAN   DISPOSITION Decision To Discharge 04/09/2021 05:14:51 PM        PATIENT REFERRED TO:  No follow-up provider specified. DISCHARGE MEDICATIONS:  Discharge Medication List as of 4/9/2021  5:12 PM      START taking these medications    Details   cefdinir (OMNICEF) 300 MG capsule Take 1 capsule by mouth 2 times daily for 7 days, Disp-14 capsule, R-0Print           Controlled Substances Monitoring:     No flowsheet data found.     (Please note that portions of this note were completed with a voice recognition program.  Efforts were made to edit the dictations but occasionally words are mis-transcribed.)    Clint Ragland MD (electronically signed)  Attending Emergency Physician            Gisela Kilgore MD  04/12/21 1111

## 2021-04-14 ENCOUNTER — HOSPITAL ENCOUNTER (EMERGENCY)
Age: 31
Discharge: HOME | End: 2021-04-14
Attending: EMERGENCY MEDICINE
Payer: COMMERCIAL

## 2021-04-14 ENCOUNTER — APPOINTMENT (OUTPATIENT)
Dept: ULTRASOUND IMAGING | Age: 31
End: 2021-04-14
Payer: COMMERCIAL

## 2021-04-14 VITALS
RESPIRATION RATE: 16 BRPM | HEART RATE: 74 BPM | DIASTOLIC BLOOD PRESSURE: 60 MMHG | OXYGEN SATURATION: 100 % | TEMPERATURE: 98 F | WEIGHT: 170 LBS | SYSTOLIC BLOOD PRESSURE: 101 MMHG | BODY MASS INDEX: 26.68 KG/M2 | HEIGHT: 67 IN

## 2021-04-14 DIAGNOSIS — O03.9 MISCARRIAGE: Primary | ICD-10-CM

## 2021-04-14 LAB
A/G RATIO: 1.5 (ref 1.1–2.2)
ABO/RH: NORMAL
ALBUMIN SERPL-MCNC: 4.3 G/DL (ref 3.4–5)
ALP BLD-CCNC: 62 U/L (ref 40–129)
ALT SERPL-CCNC: 23 U/L (ref 10–40)
ANION GAP SERPL CALCULATED.3IONS-SCNC: 7 MMOL/L (ref 3–16)
AST SERPL-CCNC: 35 U/L (ref 15–37)
BASOPHILS ABSOLUTE: 0 K/UL (ref 0–0.2)
BASOPHILS RELATIVE PERCENT: 0.4 %
BILIRUB SERPL-MCNC: 0.7 MG/DL (ref 0–1)
BILIRUBIN URINE: NEGATIVE
BLOOD, URINE: ABNORMAL
BUN BLDV-MCNC: 10 MG/DL (ref 7–20)
CALCIUM SERPL-MCNC: 9.7 MG/DL (ref 8.3–10.6)
CHLORIDE BLD-SCNC: 101 MMOL/L (ref 99–110)
CLARITY: ABNORMAL
CO2: 27 MMOL/L (ref 21–32)
COLOR: ABNORMAL
CREAT SERPL-MCNC: <0.5 MG/DL (ref 0.6–1.1)
EKG ATRIAL RATE: 65 BPM
EKG DIAGNOSIS: NORMAL
EKG P AXIS: 43 DEGREES
EKG P-R INTERVAL: 156 MS
EKG Q-T INTERVAL: 400 MS
EKG QRS DURATION: 84 MS
EKG QTC CALCULATION (BAZETT): 416 MS
EKG R AXIS: 31 DEGREES
EKG T AXIS: 57 DEGREES
EKG VENTRICULAR RATE: 65 BPM
EOSINOPHILS ABSOLUTE: 0 K/UL (ref 0–0.6)
EOSINOPHILS RELATIVE PERCENT: 0.7 %
EPITHELIAL CELLS, UA: ABNORMAL /HPF (ref 0–5)
GFR AFRICAN AMERICAN: >60
GFR NON-AFRICAN AMERICAN: >60
GLOBULIN: 2.9 G/DL
GLUCOSE BLD-MCNC: 87 MG/DL (ref 70–99)
GLUCOSE URINE: NEGATIVE MG/DL
GONADOTROPIN, CHORIONIC (HCG) QUANT: NORMAL MIU/ML
HCT VFR BLD CALC: 37.8 % (ref 36–48)
HEMOGLOBIN: 12.9 G/DL (ref 12–16)
KETONES, URINE: NEGATIVE MG/DL
LEUKOCYTE ESTERASE, URINE: NEGATIVE
LYMPHOCYTES ABSOLUTE: 1.6 K/UL (ref 1–5.1)
LYMPHOCYTES RELATIVE PERCENT: 32.5 %
MCH RBC QN AUTO: 27.5 PG (ref 26–34)
MCHC RBC AUTO-ENTMCNC: 34 G/DL (ref 31–36)
MCV RBC AUTO: 80.9 FL (ref 80–100)
MICROSCOPIC EXAMINATION: YES
MONOCYTES ABSOLUTE: 0.4 K/UL (ref 0–1.3)
MONOCYTES RELATIVE PERCENT: 8.8 %
NEUTROPHILS ABSOLUTE: 2.9 K/UL (ref 1.7–7.7)
NEUTROPHILS RELATIVE PERCENT: 57.6 %
NITRITE, URINE: NEGATIVE
PDW BLD-RTO: 16.3 % (ref 12.4–15.4)
PH UA: 8 (ref 5–8)
PLATELET # BLD: 209 K/UL (ref 135–450)
PMV BLD AUTO: 8.5 FL (ref 5–10.5)
POTASSIUM REFLEX MAGNESIUM: 4.4 MMOL/L (ref 3.5–5.1)
PROTEIN UA: 100 MG/DL
RBC # BLD: 4.68 M/UL (ref 4–5.2)
RBC UA: ABNORMAL /HPF (ref 0–4)
SODIUM BLD-SCNC: 135 MMOL/L (ref 136–145)
SPECIFIC GRAVITY UA: 1.02 (ref 1–1.03)
TOTAL PROTEIN: 7.2 G/DL (ref 6.4–8.2)
TROPONIN: <0.01 NG/ML
URINE REFLEX TO CULTURE: ABNORMAL
URINE TYPE: ABNORMAL
UROBILINOGEN, URINE: 0.2 E.U./DL
WBC # BLD: 5 K/UL (ref 4–11)
WBC UA: ABNORMAL /HPF (ref 0–5)
YEAST: PRESENT /HPF

## 2021-04-14 PROCEDURE — 76817 TRANSVAGINAL US OBSTETRIC: CPT

## 2021-04-14 PROCEDURE — 76801 OB US < 14 WKS SINGLE FETUS: CPT

## 2021-04-14 PROCEDURE — 85025 COMPLETE CBC W/AUTO DIFF WBC: CPT

## 2021-04-14 PROCEDURE — 6370000000 HC RX 637 (ALT 250 FOR IP): Performed by: PHYSICIAN ASSISTANT

## 2021-04-14 PROCEDURE — 2580000003 HC RX 258: Performed by: PHYSICIAN ASSISTANT

## 2021-04-14 PROCEDURE — 6360000002 HC RX W HCPCS: Performed by: PHYSICIAN ASSISTANT

## 2021-04-14 PROCEDURE — 86900 BLOOD TYPING SEROLOGIC ABO: CPT

## 2021-04-14 PROCEDURE — 81001 URINALYSIS AUTO W/SCOPE: CPT

## 2021-04-14 PROCEDURE — 93010 ELECTROCARDIOGRAM REPORT: CPT | Performed by: INTERNAL MEDICINE

## 2021-04-14 PROCEDURE — 99284 EMERGENCY DEPT VISIT MOD MDM: CPT

## 2021-04-14 PROCEDURE — 86901 BLOOD TYPING SEROLOGIC RH(D): CPT

## 2021-04-14 PROCEDURE — 84484 ASSAY OF TROPONIN QUANT: CPT

## 2021-04-14 PROCEDURE — 93005 ELECTROCARDIOGRAM TRACING: CPT | Performed by: PHYSICIAN ASSISTANT

## 2021-04-14 PROCEDURE — 96372 THER/PROPH/DIAG INJ SC/IM: CPT

## 2021-04-14 PROCEDURE — 80053 COMPREHEN METABOLIC PANEL: CPT

## 2021-04-14 PROCEDURE — 84702 CHORIONIC GONADOTROPIN TEST: CPT

## 2021-04-14 RX ORDER — ACETAMINOPHEN 325 MG/1
650 TABLET ORAL ONCE
Status: COMPLETED | OUTPATIENT
Start: 2021-04-14 | End: 2021-04-14

## 2021-04-14 RX ORDER — METHYLERGONOVINE MALEATE 0.2 MG/ML
200 INJECTION INTRAVENOUS ONCE
Status: COMPLETED | OUTPATIENT
Start: 2021-04-14 | End: 2021-04-14

## 2021-04-14 RX ORDER — 0.9 % SODIUM CHLORIDE 0.9 %
1000 INTRAVENOUS SOLUTION INTRAVENOUS ONCE
Status: COMPLETED | OUTPATIENT
Start: 2021-04-14 | End: 2021-04-14

## 2021-04-14 RX ORDER — METHYLERGONOVINE MALEATE 0.2 MG/1
0.2 TABLET ORAL EVERY 8 HOURS
Qty: 3 TABLET | Refills: 0 | Status: SHIPPED | OUTPATIENT
Start: 2021-04-14 | End: 2021-04-15

## 2021-04-14 RX ORDER — FLUCONAZOLE 100 MG/1
150 TABLET ORAL ONCE
Status: COMPLETED | OUTPATIENT
Start: 2021-04-14 | End: 2021-04-14

## 2021-04-14 RX ADMIN — SODIUM CHLORIDE 1000 ML: 9 INJECTION, SOLUTION INTRAVENOUS at 16:47

## 2021-04-14 RX ADMIN — FLUCONAZOLE 150 MG: 100 TABLET ORAL at 19:52

## 2021-04-14 RX ADMIN — ACETAMINOPHEN 650 MG: 325 TABLET ORAL at 18:22

## 2021-04-14 RX ADMIN — METHYLERGONOVINE MALEATE 200 MCG: 0.2 INJECTION INTRAVENOUS at 19:30

## 2021-04-14 ASSESSMENT — PAIN SCALES - GENERAL: PAINLEVEL_OUTOF10: 7

## 2021-04-14 ASSESSMENT — PAIN DESCRIPTION - DESCRIPTORS: DESCRIPTORS: SHARP

## 2021-04-14 ASSESSMENT — PAIN DESCRIPTION - FREQUENCY: FREQUENCY: CONTINUOUS

## 2021-04-14 ASSESSMENT — PAIN DESCRIPTION - LOCATION: LOCATION: ABDOMEN

## 2021-04-14 NOTE — ED PROVIDER NOTES
ED Attending Attestation Note    This patient was seen by the advance practice provider. I have seen and examined the patient. I agree with the workup, evaluation, management, and diagnosis. The care plan has been discussed. My assessment reveals 35 y.o. female, no acute distress. Abdomen soft, nontender to palpation, no rebound/guarding. EKG interpreted by myself. Rate: normal  Rhythm: NSR  Axis: normal  Intervals: within normal limits  ST Segments: no acute abnormality  T waves: no acute abnormality  Comparison: Compared to 7/6/13, there is no significant change  Impression: NSR with no acute abnormality       For further details of the patient's emergency department visit, please see the advanced practice provider's documentation. Madeline Martinez MD     This report has been produced using speech recognition software and may contain errors related to that system including errors in grammar, punctuation, and spelling, as well as words and phrases that may be inappropriate. If there are any questions or concerns please feel free to contact the dictating provider for clarification.        Madeline Martinez MD  04/14/21 4541

## 2021-04-14 NOTE — ED PROVIDER NOTES
[address] ED  EMERGENCY DEPARTMENT ENCOUNTER        Pt Name: Sascha Munoz  MRN: [id number]  [place] 1990  Date of evaluation: 4/14/2021  Provider: Benjamin Garcia PA-C  PCP: Bill Christie, DO    Shared Visit or Autonomous Visit:  I have seen and evaluated this patient with my supervising physician Tulio Galvan MD.    CHIEF COMPLAINT       Chief Complaint   Patient presents with    Threatened Miscarriage     6th pregnancy, 2 living children. 8 weeks pregnant. Bleeding has been off and on for about a month but today endorses heavy bleeding with clots and cramping. HISTORY OF PRESENT ILLNESS   (Location/Symptom, Timing/Onset, Context/Setting, Quality, Duration, Modifying Factors, Severity)  Note limiting factors. Sascha Munoz is a 35 y.o. female presenting to the emergency department for evaluation of possible miscarriage. Patient is approximately 8 weeks pregnant. This is her sixth pregnancy. She has 2 living children. 3 prior miscarriages. States she has been having intermittent vaginal bleeding off and on for the past 1 month this became worse today at 1330 this afternoon she passed tissue. Complains of crampy lower abdominal pain and low back pain. Patient seen here in the ER 4/9/2021 had pelvic ultrasound at that time gestation in the region of the right fundus was suboptimally visualized and recommended a follow-up ultrasound. Not established with OB yet. Came from group home. States on the way here started having chest pain states thinks it is anxiety c/o chest heaviness. No hx of heart problems. The history is provided by the patient.    Vaginal Bleeding  Quality:  Heavier than menses, passed tissue and clots  Onset quality:  Gradual  Duration:  1 month  Timing:  Intermittent  Progression:  Worsening  Chronicity:  New  Possible pregnancy: yes    Associated symptoms: abdominal pain and back pain    Associated symptoms: no dizziness, no dysuria, no fever and no vaginal discharge    Abdominal pain:     Location:  LLQ and RLQ    Quality: cramping      Onset quality:  Gradual  Risk factors: prior miscarriage        Nursing Notes were reviewed    REVIEW OF SYSTEMS    (2-9 systems for level 4, 10 or more for level 5)     Review of Systems   Constitutional: Negative for fever. Respiratory: Negative for cough and shortness of breath. Cardiovascular: Positive for chest pain. Negative for leg swelling. Gastrointestinal: Positive for abdominal pain. Negative for vomiting. Genitourinary: Positive for pelvic pain and vaginal bleeding. Negative for difficulty urinating, dysuria and vaginal discharge. Musculoskeletal: Positive for back pain. Neurological: Negative for dizziness and syncope. All other systems reviewed and are negative. Positives and Pertinent negatives as per HPI. PAST MEDICAL HISTORY     Past Medical History:   Diagnosis Date    Anemia     Anxiety     Bipolar disorder ([address]     Declines CF and Quad screen 12/19/2011    Drug use 12/19/2011    Hx of Marijuana use Positive amphetamine drug screen-08/03/2012     Hepatitis C     Hx of amniocentesis July, 2012    Kidney stone     Migraines     worsened with pregnancy, takes Reglan for    [facility] (premature atrial contraction)     baby has PAC's, will need EKG after delivery    Smokes 1-2 cigs/day 12/19/2011    Pt quit smoking, blanket was given.            SURGICAL HISTORY     Past Surgical History:   Procedure Laterality Date    CHOLECYSTECTOMY  9/25/2014    laparoscopic    KNEE ARTHROSCOPY  2008, 2009    2  rt. knee surg, torn meniscus    KNEE ARTHROSCOPY Right 1/2/13    Video arthroscopy with partial medial and lateral meniscectomy, chondroplasty, removal of loose bodies and medial plica excision    WISDOM TOOTH EXTRACTION           CURRENTMEDICATIONS       Discharge Medication List as of 4/14/2021  7:35 PM      CONTINUE these medications which have NOT CHANGED    Details abused: None     Physically abused: None     Forced sexual activity: None   Other Topics Concern    None   Social History Narrative    None       SCREENINGS             PHYSICAL EXAM    (up to 7 for level 4, 8 or more for level 5)     ED Triage Vitals [04/14/21 1435]   BP Temp Temp Source Pulse Resp SpO2 Height Weight   112/75 98.7 °F (37.1 °C) Oral 75 18 100 % 5' 7\" (1.702 m) 170 lb (77.1 kg)       Physical Exam  Vitals signs and nursing note reviewed. Exam conducted with a chaperone present. Constitutional:       Appearance: She is well-developed. She is not toxic-appearing. HENT:      Head: Normocephalic and atraumatic. Mouth/Throat:      Mouth: Mucous membranes are moist.      Pharynx: Oropharynx is clear. No pharyngeal swelling, oropharyngeal exudate or posterior oropharyngeal erythema. Eyes:      Conjunctiva/sclera: Conjunctivae normal.      Pupils: Pupils are equal, round, and reactive to light. Neck:      Musculoskeletal: Normal range of motion and neck supple. Vascular: No JVD. Cardiovascular:      Rate and Rhythm: Normal rate and regular rhythm. Pulses: Normal pulses. Posterior tibial pulses are 2+ on the right side and 2+ on the left side. Heart sounds: Normal heart sounds. Pulmonary:      Effort: Pulmonary effort is normal. No respiratory distress. Breath sounds: Normal breath sounds. No stridor. No wheezing, rhonchi or rales. Abdominal:      General: Bowel sounds are normal. There is no distension. Palpations: Abdomen is soft. Abdomen is not rigid. There is no mass. Tenderness: There is abdominal tenderness (mild) in the right lower quadrant, suprapubic area and left lower quadrant. There is no right CVA tenderness, left CVA tenderness, guarding or rebound. Genitourinary:     General: Normal vulva. Labia:         Right: No rash, tenderness, lesion or injury. Left: No rash, tenderness, lesion or injury.        Vagina: No signs of injury and foreign body. Bleeding (approximately 10ml blood in vaginal vault, no clots, no visible tissue, cervix is slightly open) present. No vaginal discharge. Musculoskeletal: Normal range of motion. Right lower leg: No edema. Left lower leg: No edema. Skin:     General: Skin is warm and dry. Findings: No rash. Neurological:      Mental Status: She is alert and oriented to person, place, and time. GCS: GCS eye subscore is 4. GCS verbal subscore is 5. GCS motor subscore is 6. Cranial Nerves: No cranial nerve deficit. Sensory: No sensory deficit. Motor: No abnormal muscle tone.       Coordination: Coordination normal.   Psychiatric:         Behavior: Behavior normal.         DIAGNOSTIC RESULTS   LABS:    Labs Reviewed   CBC WITH AUTO DIFFERENTIAL - Abnormal; Notable for the following components:       Result Value    RDW 16.3 (*)     All other components within normal limits    Narrative:     Performed at:  [facility],  [employer] [facility]   Phone [phone]   COMPREHENSIVE METABOLIC PANEL W/ REFLEX TO MG FOR LOW K - Abnormal; Notable for the following components:    Sodium 135 (*)     CREATININE <0.5 (*)     All other components within normal limits    Narrative:     Performed at:  [facility],  [employer]   Phone [phone]   URINE RT REFLEX TO CULTURE - Abnormal; Notable for the following components:    Color, UA RED (*)     Clarity, UA CLOUDY (*)     Blood, Urine LARGE (*)     Protein,  (*)     All other components within normal limits    Narrative:     Performed at:  [facility]) [facility],  [place] [employer]   Phone [phone]   MICROSCOPIC URINALYSIS - Abnormal; Notable for the following components:    RBC, UA  (*)     Yeast, UA Present (*)     All other components within normal limits Alkaline Phosphatase 62 40 - 129 U/L    ALT 23 10 - 40 U/L    AST 35 15 - 37 U/L    Globulin 2.9 g/dL   hCG, quantitative, pregnancy   Result Value Ref Range    hCG Quant 03317.0 <5.0 mIU/mL   Urinalysis Reflex to Culture    Specimen: Urine, clean catch   Result Value Ref Range    Color, UA RED (A) Straw/Yellow    Clarity, UA CLOUDY (A) Clear    Glucose, Ur Negative Negative mg/dL    Bilirubin Urine Negative Negative    Ketones, Urine Negative Negative mg/dL    Specific Gravity, UA 1.020 1.005 - 1.030    Blood, Urine LARGE (A) Negative    pH, UA 8.0 5.0 - 8.0    Protein,  (A) Negative mg/dL    Urobilinogen, Urine 0.2 <2.0 E.U./dL    Nitrite, Urine Negative Negative    Leukocyte Esterase, Urine Negative Negative    Microscopic Examination YES     Urine Type see below     Urine Reflex to Culture Not Indicated    Troponin   Result Value Ref Range    Troponin <0.01 <0.01 ng/mL   Microscopic Urinalysis   Result Value Ref Range    WBC, UA 3-5 0 - 5 /HPF    RBC, UA  (A) 0 - 4 /HPF    Epithelial Cells, UA 2-5 0 - 5 /HPF    Yeast, UA Present (A) None Seen /HPF   EKG 12 Lead   Result Value Ref Range    Ventricular Rate 65 BPM    Atrial Rate 65 BPM    P-R Interval 156 ms    QRS Duration 84 ms    Q-T Interval 400 ms    QTc Calculation (Bazett) 416 ms    P Axis 43 degrees    R Axis 31 degrees    T Axis 57 degrees    Diagnosis       Normal sinus rhythmNormal ECGWhen compared with ECG of 06-JUL-2013 16:27,No significant change was foundConfirmed by GIO Linda MD ([id number]) on 4/14/2021 6:00:36 PM   ABO/RH   Result Value Ref Range    ABO/Rh AB POS          All other labs were within normal range or not returned as of this dictation. EKG: All EKG's are interpreted by the Emergency Department Physician in the absence of a cardiologist.  Please see their note for interpretation of EKG.       RADIOLOGY:   Non-plain film images such as CT, Ultrasound and MRI are read by the radiologist. Plain radiographic images are visualized andpreliminarily interpreted by the  ED Provider with the below findings:        Interpretation perthe Radiologist below, if available at the time of this note:    US OB LESS THAN 14 [address]   Final Result   Cystic structure is demonstrated at the endocervical canal, potentially   reflecting spontaneous  in progress. The possibility of   pseudogestational sac in the setting of ectopic pregnancy cannot be excluded. A normal appearing intrauterine gestational sac is not identified. Thick-walled lesion within the right ovary, potentially complex corpus luteal   cyst.      Small amount of free pelvic fluid. US OB TRANSVAGINAL   Final Result   Cystic structure is demonstrated at the endocervical canal, potentially   reflecting spontaneous  in progress. The possibility of   pseudogestational sac in the setting of ectopic pregnancy cannot be excluded. A normal appearing intrauterine gestational sac is not identified. Thick-walled lesion within the right ovary, potentially complex corpus luteal   cyst.      Small amount of free pelvic fluid. Us Ob Less Than 14 Weeks Single Or First Gestation    Result Date: 2021  EXAMINATION: FIRST TRIMESTER OBSTETRIC ULTRASOUND 2021 TECHNIQUE: Transabdominal and transvaginal first trimester obstetric pelvic ultrasound was performed with color Doppler flow evaluation.; Transvaginal first trimester obstetric pelvic ultrasound was performed with color Doppler flow evaluation. COMPARISON: 2021 HISTORY: ORDERING SYSTEM PROVIDED HISTORY: +pregnant, low abd pain and vaginal bleeding TECHNOLOGIST PROVIDED HISTORY: Reason for exam:->+pregnant, low abd pain and vaginal bleeding; ORDERING SYSTEM PROVIDED HISTORY: pain TECHNOLOGIST PROVIDED HISTORY: Reason for exam:->pain FINDINGS: Uterus: 8.9 x 6.2 x 4.9 cm Endometrial stripe is approximately 1.6 cm in thickness.   An ovoid cystic structure is seen and the endocervical canal measuring approximately 1.9 x 0.7 cm with a few low-level internal echoes. No definitive yolk sac or fetal pole are seen within. The previously demonstrated cystic structure within the uterus toward the fundus on the prior study is none current leads seen. Right ovary: 2.7 x 1.7 x 1.5 cm. Flow was seen within the right ovary. Complex thick-walled lesion within the right ovary with hypoechoic center measures 2.1 x 1.6 x 1.4 cm. Left ovary: 2.1 x 2.2 x 0.8 cm. Flow was seen within the left ovary. Free fluid: Small amount of free pelvic fluid. Cystic structure is demonstrated at the endocervical canal, potentially reflecting spontaneous  in progress. The possibility of pseudogestational sac in the setting of ectopic pregnancy cannot be excluded. A normal appearing intrauterine gestational sac is not identified. Thick-walled lesion within the right ovary, potentially complex corpus luteal cyst. Small amount of free pelvic fluid. Us Ob Transvaginal    Result Date: 2021  EXAMINATION: FIRST TRIMESTER OBSTETRIC ULTRASOUND 2021 TECHNIQUE: Transabdominal and transvaginal first trimester obstetric pelvic ultrasound was performed with color Doppler flow evaluation.; Transvaginal first trimester obstetric pelvic ultrasound was performed with color Doppler flow evaluation. COMPARISON: 2021 HISTORY: ORDERING SYSTEM PROVIDED HISTORY: +pregnant, low abd pain and vaginal bleeding TECHNOLOGIST PROVIDED HISTORY: Reason for exam:->+pregnant, low abd pain and vaginal bleeding; ORDERING SYSTEM PROVIDED HISTORY: pain TECHNOLOGIST PROVIDED HISTORY: Reason for exam:->pain FINDINGS: Uterus: 8.9 x 6.2 x 4.9 cm Endometrial stripe is approximately 1.6 cm in thickness. An ovoid cystic structure is seen and the endocervical canal measuring approximately 1.9 x 0.7 cm with a few low-level internal echoes. No definitive yolk sac or fetal pole are seen within.   The previously demonstrated 0.2 MG tablet Take 1 tablet by mouth every 8 hours for 3 doses, Disp-3 tablet, R-0Print             DISCONTINUED MEDICATIONS:  Discharge Medication List as of 4/14/2021  7:35 PM                 (Please note that portions ofthis note were completed with a voice recognition program.  Efforts were made to edit the dictations but occasionally words are mis-transcribed.)    Rosa Isela Han PA-C (electronically signed)            Maki Dixon PA-C  04/15/21 [id number]

## 2021-04-14 NOTE — ED NOTES
1752 - called [facility] for GYN on call (Pt not established with [facility]) sees Maximino Martinez - Dr. Salmon called back via [address]  04/14/21 [address] [place] [address]

## 2021-04-15 ASSESSMENT — ENCOUNTER SYMPTOMS
ABDOMINAL PAIN: 1
BACK PAIN: 1
VOMITING: 0
COUGH: 0
SHORTNESS OF BREATH: 0

## 2021-04-16 ENCOUNTER — PREP FOR PROCEDURE (OUTPATIENT)
Dept: OBGYN | Age: 31
End: 2021-04-16

## 2021-04-16 RX ORDER — SODIUM CHLORIDE 9 MG/ML
25 INJECTION, SOLUTION INTRAVENOUS PRN
Status: CANCELLED | OUTPATIENT
Start: 2021-04-16

## 2021-04-16 RX ORDER — DOXYCYCLINE HYCLATE 100 MG
200 TABLET ORAL
Status: CANCELLED | OUTPATIENT
Start: 2021-04-16 | End: 2021-04-17

## 2021-04-16 RX ORDER — SODIUM CHLORIDE 0.9 % (FLUSH) 0.9 %
5-40 SYRINGE (ML) INJECTION PRN
Status: CANCELLED | OUTPATIENT
Start: 2021-04-16

## 2021-04-16 RX ORDER — SODIUM CHLORIDE 0.9 % (FLUSH) 0.9 %
5-40 SYRINGE (ML) INJECTION EVERY 12 HOURS SCHEDULED
Status: CANCELLED | OUTPATIENT
Start: 2021-04-16

## 2025-04-07 ENCOUNTER — APPOINTMENT (OUTPATIENT)
Dept: CT IMAGING | Age: 35
End: 2025-04-07
Payer: COMMERCIAL

## 2025-04-07 ENCOUNTER — APPOINTMENT (OUTPATIENT)
Dept: GENERAL RADIOLOGY | Age: 35
End: 2025-04-07
Payer: COMMERCIAL

## 2025-04-07 ENCOUNTER — HOSPITAL ENCOUNTER (EMERGENCY)
Age: 35
Discharge: ANOTHER HOSPITAL | End: 2025-04-08
Attending: STUDENT IN AN ORGANIZED HEALTH CARE EDUCATION/TRAINING PROGRAM
Payer: COMMERCIAL

## 2025-04-07 DIAGNOSIS — R65.21 SEPTIC SHOCK (HCC): Primary | ICD-10-CM

## 2025-04-07 DIAGNOSIS — M27.2 ODONTOGENIC INFECTION OF JAW: ICD-10-CM

## 2025-04-07 DIAGNOSIS — R79.89 ELEVATED TROPONIN: ICD-10-CM

## 2025-04-07 DIAGNOSIS — A41.9 SEPTIC SHOCK (HCC): Primary | ICD-10-CM

## 2025-04-07 DIAGNOSIS — E87.20 LACTIC ACIDOSIS: ICD-10-CM

## 2025-04-07 LAB
ALBUMIN SERPL-MCNC: 3.5 G/DL (ref 3.4–5)
ALBUMIN/GLOB SERPL: 0.9 {RATIO} (ref 1.1–2.2)
ALP SERPL-CCNC: 77 U/L (ref 40–129)
ALT SERPL-CCNC: 19 U/L (ref 10–40)
AMPHETAMINES UR QL SCN>1000 NG/ML: ABNORMAL
ANION GAP SERPL CALCULATED.3IONS-SCNC: 20 MMOL/L (ref 3–16)
AST SERPL-CCNC: 22 U/L (ref 15–37)
BACTERIA URNS QL MICRO: ABNORMAL /HPF
BARBITURATES UR QL SCN>200 NG/ML: ABNORMAL
BASE EXCESS BLDV CALC-SCNC: -1.3 MMOL/L (ref -3–3)
BASOPHILS # BLD: 0 K/UL (ref 0–0.2)
BASOPHILS NFR BLD: 0.2 %
BENZODIAZ UR QL SCN>200 NG/ML: ABNORMAL
BILIRUB SERPL-MCNC: 0.7 MG/DL (ref 0–1)
BILIRUB UR QL STRIP.AUTO: NEGATIVE
BUN SERPL-MCNC: 12 MG/DL (ref 7–20)
CALCIUM SERPL-MCNC: 9.2 MG/DL (ref 8.3–10.6)
CANNABINOIDS UR QL SCN>50 NG/ML: ABNORMAL
CHLORIDE SERPL-SCNC: 101 MMOL/L (ref 99–110)
CLARITY UR: CLEAR
CO2 BLDV-SCNC: 21 MMOL/L
CO2 SERPL-SCNC: 20 MMOL/L (ref 21–32)
COCAINE UR QL SCN: POSITIVE
COHGB MFR BLDV: 0.2 % (ref 0–1.5)
COLOR UR: YELLOW
CREAT SERPL-MCNC: 0.8 MG/DL (ref 0.6–1.1)
DEPRECATED RDW RBC AUTO: 14.3 % (ref 12.4–15.4)
DRUG SCREEN COMMENT UR-IMP: ABNORMAL
EOSINOPHIL # BLD: 0 K/UL (ref 0–0.6)
EOSINOPHIL NFR BLD: 0 %
EPI CELLS #/AREA URNS HPF: ABNORMAL /HPF (ref 0–5)
FENTANYL SCREEN, URINE: POSITIVE
FLUAV RNA RESP QL NAA+PROBE: NOT DETECTED
FLUBV RNA RESP QL NAA+PROBE: NOT DETECTED
GFR SERPLBLD CREATININE-BSD FMLA CKD-EPI: >90 ML/MIN/{1.73_M2}
GLUCOSE SERPL-MCNC: 171 MG/DL (ref 70–99)
GLUCOSE UR STRIP.AUTO-MCNC: NEGATIVE MG/DL
HCG SERPL QL: NEGATIVE
HCO3 BLDV-SCNC: 19.9 MMOL/L (ref 23–29)
HCT VFR BLD AUTO: 40.6 % (ref 36–48)
HGB BLD-MCNC: 13.9 G/DL (ref 12–16)
HGB UR QL STRIP.AUTO: ABNORMAL
INR PPP: 1.18 (ref 0.85–1.15)
KETONES UR STRIP.AUTO-MCNC: 15 MG/DL
LACTATE BLDV-SCNC: 1.2 MMOL/L (ref 0.4–1.9)
LACTATE BLDV-SCNC: 3.4 MMOL/L (ref 0.4–1.9)
LACTATE BLDV-SCNC: 6.9 MMOL/L (ref 0.4–1.9)
LEUKOCYTE ESTERASE UR QL STRIP.AUTO: NEGATIVE
LYMPHOCYTES # BLD: 0.6 K/UL (ref 1–5.1)
LYMPHOCYTES NFR BLD: 3.4 %
MAGNESIUM SERPL-MCNC: 1.42 MG/DL (ref 1.8–2.4)
MCH RBC QN AUTO: 27.1 PG (ref 26–34)
MCHC RBC AUTO-ENTMCNC: 34.4 G/DL (ref 31–36)
MCV RBC AUTO: 78.8 FL (ref 80–100)
METHADONE UR QL SCN>300 NG/ML: ABNORMAL
METHGB MFR BLDV: 0.2 %
MONOCYTES # BLD: 0.3 K/UL (ref 0–1.3)
MONOCYTES NFR BLD: 1.9 %
MUCOUS THREADS #/AREA URNS LPF: ABNORMAL /LPF
NEUTROPHILS # BLD: 16.6 K/UL (ref 1.7–7.7)
NEUTROPHILS NFR BLD: 94.5 %
NITRITE UR QL STRIP.AUTO: NEGATIVE
O2 THERAPY: ABNORMAL
OPIATES UR QL SCN>300 NG/ML: ABNORMAL
OXYCODONE UR QL SCN: ABNORMAL
PCO2 BLDV: 24.7 MMHG (ref 40–50)
PCP UR QL SCN>25 NG/ML: ABNORMAL
PH BLDV: 7.52 [PH] (ref 7.35–7.45)
PH UR STRIP.AUTO: 7 [PH] (ref 5–8)
PH UR STRIP: 7 [PH]
PLATELET # BLD AUTO: 440 K/UL (ref 135–450)
PMV BLD AUTO: 8.2 FL (ref 5–10.5)
PO2 BLDV: 168.6 MMHG (ref 25–40)
POTASSIUM SERPL-SCNC: 3.1 MMOL/L (ref 3.5–5.1)
PROT SERPL-MCNC: 7.2 G/DL (ref 6.4–8.2)
PROT UR STRIP.AUTO-MCNC: NEGATIVE MG/DL
PROTHROMBIN TIME: 15.2 SEC (ref 11.9–14.9)
RBC # BLD AUTO: 5.15 M/UL (ref 4–5.2)
RBC #/AREA URNS HPF: ABNORMAL /HPF (ref 0–4)
REASON FOR REJECTION: NORMAL
REJECTED TEST: NORMAL
SAO2 % BLDV: 99 %
SARS-COV-2 RNA RESP QL NAA+PROBE: NOT DETECTED
SODIUM SERPL-SCNC: 141 MMOL/L (ref 136–145)
SP GR UR STRIP.AUTO: <=1.005 (ref 1–1.03)
TROPONIN, HIGH SENSITIVITY: 24 NG/L (ref 0–14)
TROPONIN, HIGH SENSITIVITY: 34 NG/L (ref 0–14)
TROPONIN, HIGH SENSITIVITY: 37 NG/L (ref 0–14)
TSH SERPL DL<=0.005 MIU/L-ACNC: 0.29 UIU/ML (ref 0.27–4.2)
UA COMPLETE W REFLEX CULTURE PNL UR: ABNORMAL
UA DIPSTICK W REFLEX MICRO PNL UR: YES
URN SPEC COLLECT METH UR: ABNORMAL
UROBILINOGEN UR STRIP-ACNC: 0.2 E.U./DL
WBC # BLD AUTO: 17.6 K/UL (ref 4–11)
WBC #/AREA URNS HPF: ABNORMAL /HPF (ref 0–5)

## 2025-04-07 PROCEDURE — 87040 BLOOD CULTURE FOR BACTERIA: CPT

## 2025-04-07 PROCEDURE — 6360000002 HC RX W HCPCS: Performed by: STUDENT IN AN ORGANIZED HEALTH CARE EDUCATION/TRAINING PROGRAM

## 2025-04-07 PROCEDURE — 80307 DRUG TEST PRSMV CHEM ANLYZR: CPT

## 2025-04-07 PROCEDURE — 96366 THER/PROPH/DIAG IV INF ADDON: CPT

## 2025-04-07 PROCEDURE — 70487 CT MAXILLOFACIAL W/DYE: CPT

## 2025-04-07 PROCEDURE — 36415 COLL VENOUS BLD VENIPUNCTURE: CPT

## 2025-04-07 PROCEDURE — 6370000000 HC RX 637 (ALT 250 FOR IP): Performed by: STUDENT IN AN ORGANIZED HEALTH CARE EDUCATION/TRAINING PROGRAM

## 2025-04-07 PROCEDURE — 87636 SARSCOV2 & INF A&B AMP PRB: CPT

## 2025-04-07 PROCEDURE — 93005 ELECTROCARDIOGRAM TRACING: CPT | Performed by: STUDENT IN AN ORGANIZED HEALTH CARE EDUCATION/TRAINING PROGRAM

## 2025-04-07 PROCEDURE — 96375 TX/PRO/DX INJ NEW DRUG ADDON: CPT

## 2025-04-07 PROCEDURE — 80053 COMPREHEN METABOLIC PANEL: CPT

## 2025-04-07 PROCEDURE — 85025 COMPLETE CBC W/AUTO DIFF WBC: CPT

## 2025-04-07 PROCEDURE — 83605 ASSAY OF LACTIC ACID: CPT

## 2025-04-07 PROCEDURE — 84703 CHORIONIC GONADOTROPIN ASSAY: CPT

## 2025-04-07 PROCEDURE — 84443 ASSAY THYROID STIM HORMONE: CPT

## 2025-04-07 PROCEDURE — 2580000003 HC RX 258: Performed by: STUDENT IN AN ORGANIZED HEALTH CARE EDUCATION/TRAINING PROGRAM

## 2025-04-07 PROCEDURE — 70450 CT HEAD/BRAIN W/O DYE: CPT

## 2025-04-07 PROCEDURE — 84484 ASSAY OF TROPONIN QUANT: CPT

## 2025-04-07 PROCEDURE — 81001 URINALYSIS AUTO W/SCOPE: CPT

## 2025-04-07 PROCEDURE — 82803 BLOOD GASES ANY COMBINATION: CPT

## 2025-04-07 PROCEDURE — 99285 EMERGENCY DEPT VISIT HI MDM: CPT

## 2025-04-07 PROCEDURE — 6360000004 HC RX CONTRAST MEDICATION: Performed by: STUDENT IN AN ORGANIZED HEALTH CARE EDUCATION/TRAINING PROGRAM

## 2025-04-07 PROCEDURE — 96365 THER/PROPH/DIAG IV INF INIT: CPT

## 2025-04-07 PROCEDURE — 96361 HYDRATE IV INFUSION ADD-ON: CPT

## 2025-04-07 PROCEDURE — 83735 ASSAY OF MAGNESIUM: CPT

## 2025-04-07 PROCEDURE — 85610 PROTHROMBIN TIME: CPT

## 2025-04-07 PROCEDURE — 96367 TX/PROPH/DG ADDL SEQ IV INF: CPT

## 2025-04-07 PROCEDURE — 71260 CT THORAX DX C+: CPT

## 2025-04-07 PROCEDURE — 71046 X-RAY EXAM CHEST 2 VIEWS: CPT

## 2025-04-07 RX ORDER — IOPAMIDOL 755 MG/ML
75 INJECTION, SOLUTION INTRAVASCULAR
Status: COMPLETED | OUTPATIENT
Start: 2025-04-07 | End: 2025-04-07

## 2025-04-07 RX ORDER — LORAZEPAM 2 MG/ML
1 INJECTION INTRAMUSCULAR ONCE
Status: COMPLETED | OUTPATIENT
Start: 2025-04-07 | End: 2025-04-08

## 2025-04-07 RX ORDER — ACETAMINOPHEN 650 MG/1
650 SUPPOSITORY RECTAL ONCE
Status: DISCONTINUED | OUTPATIENT
Start: 2025-04-07 | End: 2025-04-08 | Stop reason: HOSPADM

## 2025-04-07 RX ORDER — IOPAMIDOL 755 MG/ML
40 INJECTION, SOLUTION INTRAVASCULAR
Status: COMPLETED | OUTPATIENT
Start: 2025-04-07 | End: 2025-04-07

## 2025-04-07 RX ORDER — SODIUM CHLORIDE, SODIUM LACTATE, POTASSIUM CHLORIDE, AND CALCIUM CHLORIDE .6; .31; .03; .02 G/100ML; G/100ML; G/100ML; G/100ML
1000 INJECTION, SOLUTION INTRAVENOUS ONCE
Status: COMPLETED | OUTPATIENT
Start: 2025-04-07 | End: 2025-04-07

## 2025-04-07 RX ORDER — MAGNESIUM SULFATE IN WATER 40 MG/ML
2000 INJECTION, SOLUTION INTRAVENOUS ONCE
Status: COMPLETED | OUTPATIENT
Start: 2025-04-07 | End: 2025-04-07

## 2025-04-07 RX ORDER — ONDANSETRON 2 MG/ML
4 INJECTION INTRAMUSCULAR; INTRAVENOUS ONCE
Status: COMPLETED | OUTPATIENT
Start: 2025-04-07 | End: 2025-04-07

## 2025-04-07 RX ORDER — KETOROLAC TROMETHAMINE 15 MG/ML
15 INJECTION, SOLUTION INTRAMUSCULAR; INTRAVENOUS ONCE
Status: COMPLETED | OUTPATIENT
Start: 2025-04-07 | End: 2025-04-07

## 2025-04-07 RX ORDER — BUPRENORPHINE HYDROCHLORIDE AND NALOXONE HYDROCHLORIDE DIHYDRATE 8; 2 MG/1; MG/1
1 TABLET SUBLINGUAL DAILY
Status: DISCONTINUED | OUTPATIENT
Start: 2025-04-08 | End: 2025-04-08 | Stop reason: HOSPADM

## 2025-04-07 RX ORDER — ACETAMINOPHEN 500 MG
1000 TABLET ORAL
Status: COMPLETED | OUTPATIENT
Start: 2025-04-07 | End: 2025-04-07

## 2025-04-07 RX ADMIN — CEFEPIME 2000 MG: 2 INJECTION, POWDER, FOR SOLUTION INTRAVENOUS at 17:00

## 2025-04-07 RX ADMIN — ACETAMINOPHEN 1000 MG: 500 TABLET ORAL at 23:05

## 2025-04-07 RX ADMIN — MAGNESIUM SULFATE HEPTAHYDRATE 2000 MG: 40 INJECTION, SOLUTION INTRAVENOUS at 20:54

## 2025-04-07 RX ADMIN — ONDANSETRON 4 MG: 2 INJECTION, SOLUTION INTRAMUSCULAR; INTRAVENOUS at 22:06

## 2025-04-07 RX ADMIN — SODIUM CHLORIDE, SODIUM LACTATE, POTASSIUM CHLORIDE, AND CALCIUM CHLORIDE 1000 ML: .6; .31; .03; .02 INJECTION, SOLUTION INTRAVENOUS at 19:16

## 2025-04-07 RX ADMIN — IOPAMIDOL 40 ML: 755 INJECTION, SOLUTION INTRAVENOUS at 17:39

## 2025-04-07 RX ADMIN — IOPAMIDOL 75 ML: 755 INJECTION, SOLUTION INTRAVENOUS at 17:39

## 2025-04-07 RX ADMIN — SODIUM CHLORIDE, SODIUM LACTATE, POTASSIUM CHLORIDE, AND CALCIUM CHLORIDE 1000 ML: .6; .31; .03; .02 INJECTION, SOLUTION INTRAVENOUS at 16:15

## 2025-04-07 RX ADMIN — KETOROLAC TROMETHAMINE 15 MG: 15 INJECTION, SOLUTION INTRAMUSCULAR; INTRAVENOUS at 22:06

## 2025-04-07 RX ADMIN — VANCOMYCIN HYDROCHLORIDE 2000 MG: 10 INJECTION, POWDER, LYOPHILIZED, FOR SOLUTION INTRAVENOUS at 18:08

## 2025-04-08 VITALS
SYSTOLIC BLOOD PRESSURE: 134 MMHG | OXYGEN SATURATION: 96 % | TEMPERATURE: 98.2 F | RESPIRATION RATE: 8 BRPM | DIASTOLIC BLOOD PRESSURE: 86 MMHG | HEART RATE: 106 BPM

## 2025-04-08 LAB
EKG ATRIAL RATE: 108 BPM
EKG ATRIAL RATE: 111 BPM
EKG DIAGNOSIS: NORMAL
EKG DIAGNOSIS: NORMAL
EKG P AXIS: 74 DEGREES
EKG P AXIS: 83 DEGREES
EKG P-R INTERVAL: 130 MS
EKG P-R INTERVAL: 134 MS
EKG Q-T INTERVAL: 270 MS
EKG Q-T INTERVAL: 408 MS
EKG QRS DURATION: 72 MS
EKG QRS DURATION: 74 MS
EKG QTC CALCULATION (BAZETT): 361 MS
EKG QTC CALCULATION (BAZETT): 554 MS
EKG R AXIS: 56 DEGREES
EKG R AXIS: 56 DEGREES
EKG T AXIS: 72 DEGREES
EKG T AXIS: 86 DEGREES
EKG VENTRICULAR RATE: 108 BPM
EKG VENTRICULAR RATE: 111 BPM

## 2025-04-08 PROCEDURE — 6360000002 HC RX W HCPCS: Performed by: STUDENT IN AN ORGANIZED HEALTH CARE EDUCATION/TRAINING PROGRAM

## 2025-04-08 PROCEDURE — 96375 TX/PRO/DX INJ NEW DRUG ADDON: CPT

## 2025-04-08 PROCEDURE — 6360000002 HC RX W HCPCS: Performed by: EMERGENCY MEDICINE

## 2025-04-08 PROCEDURE — 96361 HYDRATE IV INFUSION ADD-ON: CPT

## 2025-04-08 PROCEDURE — 2580000003 HC RX 258: Performed by: STUDENT IN AN ORGANIZED HEALTH CARE EDUCATION/TRAINING PROGRAM

## 2025-04-08 PROCEDURE — 96366 THER/PROPH/DIAG IV INF ADDON: CPT

## 2025-04-08 PROCEDURE — 96367 TX/PROPH/DG ADDL SEQ IV INF: CPT

## 2025-04-08 PROCEDURE — 2580000003 HC RX 258: Performed by: EMERGENCY MEDICINE

## 2025-04-08 RX ORDER — SODIUM CHLORIDE 9 MG/ML
1000 INJECTION, SOLUTION INTRAVENOUS CONTINUOUS
Status: ACTIVE | OUTPATIENT
Start: 2025-04-08 | End: 2025-04-08

## 2025-04-08 RX ADMIN — LORAZEPAM 1 MG: 2 INJECTION INTRAMUSCULAR; INTRAVENOUS at 00:48

## 2025-04-08 RX ADMIN — BUPRENORPHINE HYDROCHLORIDE AND NALOXONE HYDROCHLORIDE DIHYDRATE 1 TABLET: 8; 2 TABLET SUBLINGUAL at 08:41

## 2025-04-08 RX ADMIN — SODIUM CHLORIDE 3000 MG: 9 INJECTION, SOLUTION INTRAVENOUS at 07:51

## 2025-04-08 RX ADMIN — SODIUM CHLORIDE 500 ML: 0.9 INJECTION, SOLUTION INTRAVENOUS at 12:41

## 2025-04-08 RX ADMIN — SODIUM CHLORIDE 3000 MG: 9 INJECTION, SOLUTION INTRAVENOUS at 13:35

## 2025-04-08 RX ADMIN — CEFEPIME 2000 MG: 2 INJECTION, POWDER, FOR SOLUTION INTRAVENOUS at 02:04

## 2025-04-08 ASSESSMENT — PAIN SCALES - GENERAL: PAINLEVEL_OUTOF10: 8

## 2025-04-08 NOTE — ED NOTES
2003 Called [facility] with a stat consult for Patrick Jean, waiting for a call back

## 2025-04-08 NOTE — ED NOTES
0846 - called transfer center and spoke with Dali, to initiate communication between Dr. Delgado and  [facility] in regards to patient condition.    0855 - transfer center called with  Hospitalist on the line about patient wanting to speak with Dr. Davis but Dr. Delgado is the attending so  Hospitalist is speaking with Dr. Delgado currently. Still waiting on  [facility] to call back.    0904 - made four attempts to call radiology on multiple different extensions to get images sent to  but with no answer.  0910 - still no answer from radiology when I try to call so EMERALD Driscoll and EMERALD Mtz in orientation walked over to radiology departments to request images being sent over.    1000 - received call back from transfer center with [facility] on the line, who is now speaking with Dr. Delgado.    1004 -  stated they did not receive the images so EMERALD Driscoll called radiology. Radiology stated to EMERALD Driscoll that they did send patient's images to .

## 2025-04-08 NOTE — ED PROVIDER NOTES
[facility] EMERGENCY DEPARTMENT      CHIEF COMPLAINT  Altered Mental Status       HISTORY OF PRESENT ILLNESS  Roxana Dorado is a 34 y.o. female  who presents to the ED from [facility] due to altered mental status.  Patient apparently tested positive for opiates on their spot screen.  They gave her a dose of buprenorphine, shortly after which she became very confused prompting them to call EMS.  No other medications given.  Patient is intermittently answering questions on exam.  She currently denies any chest pain, shortness of breath, nausea, vomiting.  She does have swelling over the right jaw and does report that this is painful when asked directly about it.  States that the swelling started 3 days ago.  Denies any nausea or vomiting or diarrhea.    No other complaints, modifying factors or associated symptoms.     I have reviewed the following from the nursing documentation.    Past Medical History:   Diagnosis Date    Anemia     Anxiety     Bipolar disorder (HCC)     Declines CF and Quad screen 12/19/2011    Drug use 12/19/2011    Hx of Marijuana use Positive amphetamine drug screen-08/03/2012     Hepatitis C     Hx of amniocentesis July, 2012    Kidney stone     Migraines     worsened with pregnancy, takes Reglan for    PAC (premature atrial contraction)     baby has PAC's, will need EKG after delivery    Smokes 1-2 cigs/day 12/19/2011    Pt quit smoking, blanket was given.       Past Surgical History:   Procedure Laterality Date    CHOLECYSTECTOMY  9/25/2014    laparoscopic    KNEE ARTHROSCOPY  2008, 2009    2  rt. knee surg, torn meniscus    KNEE ARTHROSCOPY Right 1/2/13    Video arthroscopy with partial medial and lateral meniscectomy, chondroplasty, removal of loose bodies and medial plica excision    WISDOM TOOTH EXTRACTION       Family History   Problem Relation Age of Onset    Depression Mother     Thyroid Disease Maternal Grandmother     Arthritis Maternal Grandmother     Hypertension Maternal 
               Razia Delgado MD  04/08/25 [id number]

## 2025-04-11 LAB
BACTERIA BLD CULT ORG #2: NORMAL
BACTERIA BLD CULT: NORMAL